# Patient Record
Sex: FEMALE | Race: WHITE | NOT HISPANIC OR LATINO | Employment: FULL TIME | URBAN - METROPOLITAN AREA
[De-identification: names, ages, dates, MRNs, and addresses within clinical notes are randomized per-mention and may not be internally consistent; named-entity substitution may affect disease eponyms.]

---

## 2019-07-11 ENCOUNTER — OFFICE VISIT (OUTPATIENT)
Dept: URGENT CARE | Facility: MEDICAL CENTER | Age: 39
End: 2019-07-11
Payer: COMMERCIAL

## 2019-07-11 VITALS
DIASTOLIC BLOOD PRESSURE: 72 MMHG | OXYGEN SATURATION: 97 % | BODY MASS INDEX: 30.49 KG/M2 | SYSTOLIC BLOOD PRESSURE: 110 MMHG | TEMPERATURE: 98.9 F | WEIGHT: 183 LBS | RESPIRATION RATE: 18 BRPM | HEIGHT: 65 IN | HEART RATE: 98 BPM

## 2019-07-11 DIAGNOSIS — L03.031 CELLULITIS OF TOE OF RIGHT FOOT: Primary | ICD-10-CM

## 2019-07-11 PROCEDURE — G0382 LEV 3 HOSP TYPE B ED VISIT: HCPCS | Performed by: PHYSICIAN ASSISTANT

## 2019-07-11 RX ORDER — SULFAMETHOXAZOLE AND TRIMETHOPRIM 800; 160 MG/1; MG/1
1 TABLET ORAL EVERY 12 HOURS SCHEDULED
Qty: 14 TABLET | Refills: 0 | Status: SHIPPED | OUTPATIENT
Start: 2019-07-11 | End: 2019-07-18

## 2019-07-11 NOTE — PROGRESS NOTES
[facility]        NAME: Kala Guadarrama is a 35 y o  female  : 1980    MRN: [id number]  DATE: 2019  TIME: 6:32 PM    Assessment and Plan   Cellulitis of toe of right foot [L03 [id number]]  1  Cellulitis of toe of right foot  sulfamethoxazole-trimethoprim (BACTRIM DS) 800-160 mg per tablet         Patient Instructions     1  Take Bactrim DS 1 tablet twice daily x 7 days  2  Motrin as needed for pain  3  Recommend Podiatry consult- information provided  Chief Complaint     Chief Complaint   Patient presents with    Foot Swelling     Right foot swelling x 1 month  Pt was treated for cellulitis on 19  Pt had improvement but now swelling has returned  History of Present Illness       Melissa Crane is a 69-year-old female presents with pain and swelling of her right great toe that increased over the past week  Patient was treated for a foot cellulitis approximately 3 weeks ago and recently finished a 10 day course of Keflex  Patient had complete resolution of symptoms initially but started with redness, swelling increasing pain of the toe and foot over the past 5 days  Patient denies any fever or skin drainage  Review of Systems   Review of Systems   Constitutional: Negative  Musculoskeletal: Negative for gait problem and joint swelling  Skin: Positive for color change           Current Medications       Current Outpatient Medications:     sulfamethoxazole-trimethoprim (BACTRIM DS) 800-160 mg per tablet, Take 1 tablet by mouth every 12 (twelve) hours for 7 days, Disp: 14 tablet, Rfl: 0    Current Allergies     Allergies as of 2019    (No Known Allergies)            The following portions of the patient's history were reviewed and updated as appropriate: allergies, current medications, past family history, past medical history, past social history, past surgical history and problem list      Past Medical History:   Diagnosis Date    Cellulitis        Past Surgical History:   Procedure Laterality Date    CHOLECYSTECTOMY         Family History   Problem Relation Age of Onset    No Known Problems Mother     Heart disease Father          Medications have been verified  Objective   /72   Pulse 98   Temp 98 9 °F (37 2 °C) (Temporal)   Resp 18   Ht 5' 5" (1 651 m)   Wt 83 kg (183 lb)   LMP 06/27/2019 (Exact Date)   SpO2 97%   BMI 30 45 kg/m²        Physical Exam     Physical Exam   Constitutional: She appears well-developed and well-nourished  No distress  Cardiovascular: Normal rate, regular rhythm and normal heart sounds  No murmur heard    Pulmonary/Chest: Effort normal and breath sounds normal    Musculoskeletal:        Feet:    Skin:

## 2020-09-16 ENCOUNTER — OFFICE VISIT (OUTPATIENT)
Dept: FAMILY MEDICINE CLINIC | Facility: CLINIC | Age: 40
End: 2020-09-16
Payer: COMMERCIAL

## 2020-09-16 VITALS
RESPIRATION RATE: 18 BRPM | TEMPERATURE: 98.5 F | HEART RATE: 75 BPM | SYSTOLIC BLOOD PRESSURE: 124 MMHG | BODY MASS INDEX: 30.99 KG/M2 | HEIGHT: 65 IN | WEIGHT: 186 LBS | DIASTOLIC BLOOD PRESSURE: 72 MMHG | OXYGEN SATURATION: 98 %

## 2020-09-16 DIAGNOSIS — Z82.49 FAMILY HISTORY OF BLOOD CLOTS: Primary | ICD-10-CM

## 2020-09-16 DIAGNOSIS — Z12.31 SCREENING MAMMOGRAM FOR HIGH-RISK PATIENT: ICD-10-CM

## 2020-09-16 DIAGNOSIS — Z00.00 WELL ADULT HEALTH CHECK: ICD-10-CM

## 2020-09-16 PROCEDURE — 1036F TOBACCO NON-USER: CPT | Performed by: FAMILY MEDICINE

## 2020-09-16 PROCEDURE — 3725F SCREEN DEPRESSION PERFORMED: CPT | Performed by: FAMILY MEDICINE

## 2020-09-16 PROCEDURE — 99215 OFFICE O/P EST HI 40 MIN: CPT | Performed by: FAMILY MEDICINE

## 2020-09-16 NOTE — PROGRESS NOTES
BMI Counseling: Body mass index is 30 95 kg/m²  The BMI is above normal  Nutrition recommendations include decreasing portion sizes, encouraging healthy choices of fruits and vegetables, decreasing fast food intake, moderation in carbohydrate intake, increasing intake of lean protein, reducing intake of saturated and trans fat and reducing intake of cholesterol  Exercise recommendations include exercising 3-5 times per week  No pharmacotherapy was ordered  Patient referred to PCP due to patient being overweight  Assessment/Plan:      Diagnoses and all orders for this visit:    Family history of blood clots  -     Prothrombin w/INR + Partial Thromboplastin Times; Future  -     von Willebrand Profile; Future  -     Antithrombin III Activity W/Refl; Future  -     Protein C and S Activity With Reflex To Protein C and/or S Antigen; Future    Screening mammogram for high-risk patient  -     Mammo screening bilateral w 3d & cad; Future  -     TSH + Free T4; Future  -     Comprehensive metabolic panel; Future  -     CBC and differential; Future  -     Magnesium; Future  -     Uric acid; Future  -     Lipid Panel with Direct LDL reflex; Future  -     Urinalysis with microscopic    Well adult health check  -     Ambulatory referral to Obstetrics / Gynecology; Future          Subjective:     Patient ID: Mallorie Ling is a 38 y o  female  Pt for initial exam as a new patient  Pt with a family history of  Blood clots  Pt  Takes no meds and is doing well  Pt is a non smoker  Review of Systems   Constitutional: Negative for activity change, appetite change, fatigue and fever  HENT: Negative for congestion, ear pain, postnasal drip, rhinorrhea, sinus pressure, sinus pain, sneezing and sore throat  Eyes: Negative for pain and redness  Respiratory: Negative for apnea, cough, chest tightness, shortness of breath and wheezing  Cardiovascular: Negative for chest pain, palpitations and leg swelling  Gastrointestinal: Negative for abdominal pain, constipation, diarrhea, nausea and vomiting  Endocrine: Negative for cold intolerance and heat intolerance  Genitourinary: Negative for difficulty urinating, dysuria, frequency, hematuria and urgency  Musculoskeletal: Negative for arthralgias, back pain, gait problem and myalgias  Skin: Negative for rash  Neurological: Negative for dizziness, speech difficulty, weakness, numbness and headaches  Hematological: Does not bruise/bleed easily  Psychiatric/Behavioral: Negative for agitation, confusion and hallucinations  Objective:     Physical Exam  Vitals signs and nursing note reviewed  Constitutional:       Appearance: She is well-developed  She is obese  HENT:      Head: Normocephalic and atraumatic  Nose: Nose normal    Eyes:      General: No scleral icterus  Conjunctiva/sclera: Conjunctivae normal       Pupils: Pupils are equal, round, and reactive to light  Neck:      Musculoskeletal: Normal range of motion and neck supple  Thyroid: No thyromegaly  Cardiovascular:      Rate and Rhythm: Normal rate and regular rhythm  Pulmonary:      Effort: Pulmonary effort is normal       Breath sounds: Normal breath sounds  No wheezing  Abdominal:      General: Bowel sounds are normal  There is no distension  Palpations: Abdomen is soft  Tenderness: There is no abdominal tenderness  There is no guarding or rebound  Musculoskeletal: Normal range of motion  General: No tenderness or deformity  Skin:     General: Skin is warm and dry  Findings: No erythema or rash  Neurological:      Mental Status: She is alert and oriented to person, place, and time  Sensory: No sensory deficit  Psychiatric:         Behavior: Behavior normal          Thought Content:  Thought content normal          Judgment: Judgment normal

## 2020-09-19 ENCOUNTER — APPOINTMENT (OUTPATIENT)
Dept: LAB | Facility: MEDICAL CENTER | Age: 40
End: 2020-09-19
Payer: COMMERCIAL

## 2020-09-19 DIAGNOSIS — Z82.49 FAMILY HISTORY OF BLOOD CLOTS: ICD-10-CM

## 2020-09-19 DIAGNOSIS — Z12.31 SCREENING MAMMOGRAM FOR HIGH-RISK PATIENT: ICD-10-CM

## 2020-09-19 LAB
ALBUMIN SERPL BCP-MCNC: 4.2 G/DL (ref 3.5–5)
ALP SERPL-CCNC: 50 U/L (ref 46–116)
ALT SERPL W P-5'-P-CCNC: 18 U/L (ref 12–78)
ANION GAP SERPL CALCULATED.3IONS-SCNC: 5 MMOL/L (ref 4–13)
APTT PPP: 30 SECONDS (ref 23–37)
AST SERPL W P-5'-P-CCNC: 11 U/L (ref 5–45)
BACTERIA UR QL AUTO: NORMAL /HPF
BASOPHILS # BLD AUTO: 0.08 THOUSANDS/ΜL (ref 0–0.1)
BASOPHILS NFR BLD AUTO: 1 % (ref 0–1)
BILIRUB SERPL-MCNC: 0.68 MG/DL (ref 0.2–1)
BILIRUB UR QL STRIP: NEGATIVE
BUN SERPL-MCNC: 17 MG/DL (ref 5–25)
CALCIUM SERPL-MCNC: 9.7 MG/DL (ref 8.3–10.1)
CHLORIDE SERPL-SCNC: 106 MMOL/L (ref 100–108)
CHOLEST SERPL-MCNC: 208 MG/DL (ref 50–200)
CLARITY UR: CLEAR
CO2 SERPL-SCNC: 26 MMOL/L (ref 21–32)
COLOR UR: YELLOW
CREAT SERPL-MCNC: 0.96 MG/DL (ref 0.6–1.3)
DEPRECATED AT III PPP: 89 % OF NORMAL (ref 92–136)
EOSINOPHIL # BLD AUTO: 0.14 THOUSAND/ΜL (ref 0–0.61)
EOSINOPHIL NFR BLD AUTO: 1 % (ref 0–6)
ERYTHROCYTE [DISTWIDTH] IN BLOOD BY AUTOMATED COUNT: 13 % (ref 11.6–15.1)
GFR SERPL CREATININE-BSD FRML MDRD: 74 ML/MIN/1.73SQ M
GLUCOSE P FAST SERPL-MCNC: 73 MG/DL (ref 65–99)
GLUCOSE UR STRIP-MCNC: NEGATIVE MG/DL
HCT VFR BLD AUTO: 39.6 % (ref 34.8–46.1)
HDLC SERPL-MCNC: 62 MG/DL
HGB BLD-MCNC: 12.9 G/DL (ref 11.5–15.4)
HGB UR QL STRIP.AUTO: NEGATIVE
HYALINE CASTS #/AREA URNS LPF: NORMAL /LPF
IMM GRANULOCYTES # BLD AUTO: 0.01 THOUSAND/UL (ref 0–0.2)
IMM GRANULOCYTES NFR BLD AUTO: 0 % (ref 0–2)
INR PPP: 0.95 (ref 0.84–1.19)
KETONES UR STRIP-MCNC: NEGATIVE MG/DL
LDLC SERPL CALC-MCNC: 123 MG/DL (ref 0–100)
LEUKOCYTE ESTERASE UR QL STRIP: NEGATIVE
LYMPHOCYTES # BLD AUTO: 2.04 THOUSANDS/ΜL (ref 0.6–4.47)
LYMPHOCYTES NFR BLD AUTO: 21 % (ref 14–44)
MAGNESIUM SERPL-MCNC: 2.4 MG/DL (ref 1.6–2.6)
MCH RBC QN AUTO: 27.8 PG (ref 26.8–34.3)
MCHC RBC AUTO-ENTMCNC: 32.6 G/DL (ref 31.4–37.4)
MCV RBC AUTO: 85 FL (ref 82–98)
MONOCYTES # BLD AUTO: 0.47 THOUSAND/ΜL (ref 0.17–1.22)
MONOCYTES NFR BLD AUTO: 5 % (ref 4–12)
NEUTROPHILS # BLD AUTO: 6.97 THOUSANDS/ΜL (ref 1.85–7.62)
NEUTS SEG NFR BLD AUTO: 72 % (ref 43–75)
NITRITE UR QL STRIP: NEGATIVE
NON-SQ EPI CELLS URNS QL MICRO: NORMAL /HPF
NRBC BLD AUTO-RTO: 0 /100 WBCS
PH UR STRIP.AUTO: 6.5 [PH]
PLATELET # BLD AUTO: 336 THOUSANDS/UL (ref 149–390)
PMV BLD AUTO: 10.2 FL (ref 8.9–12.7)
POTASSIUM SERPL-SCNC: 4.5 MMOL/L (ref 3.5–5.3)
PROT SERPL-MCNC: 8.1 G/DL (ref 6.4–8.2)
PROT UR STRIP-MCNC: NEGATIVE MG/DL
PROTHROMBIN TIME: 12.7 SECONDS (ref 11.6–14.5)
RBC # BLD AUTO: 4.64 MILLION/UL (ref 3.81–5.12)
RBC #/AREA URNS AUTO: NORMAL /HPF
SODIUM SERPL-SCNC: 137 MMOL/L (ref 136–145)
SP GR UR STRIP.AUTO: 1.01 (ref 1–1.03)
T4 FREE SERPL-MCNC: 1 NG/DL (ref 0.76–1.46)
TRIGL SERPL-MCNC: 117 MG/DL
TSH SERPL DL<=0.05 MIU/L-ACNC: 3.07 UIU/ML (ref 0.36–3.74)
URATE SERPL-MCNC: 6.4 MG/DL (ref 2–6.8)
UROBILINOGEN UR QL STRIP.AUTO: 0.2 E.U./DL
WBC # BLD AUTO: 9.71 THOUSAND/UL (ref 4.31–10.16)
WBC #/AREA URNS AUTO: NORMAL /HPF

## 2020-09-19 PROCEDURE — 84443 ASSAY THYROID STIM HORMONE: CPT

## 2020-09-19 PROCEDURE — 85240 CLOT FACTOR VIII AHG 1 STAGE: CPT

## 2020-09-19 PROCEDURE — 85303 CLOT INHIBIT PROT C ACTIVITY: CPT

## 2020-09-19 PROCEDURE — 85246 CLOT FACTOR VIII VW ANTIGEN: CPT

## 2020-09-19 PROCEDURE — 80053 COMPREHEN METABOLIC PANEL: CPT

## 2020-09-19 PROCEDURE — 85245 CLOT FACTOR VIII VW RISTOCTN: CPT

## 2020-09-19 PROCEDURE — 85025 COMPLETE CBC W/AUTO DIFF WBC: CPT

## 2020-09-19 PROCEDURE — 36415 COLL VENOUS BLD VENIPUNCTURE: CPT

## 2020-09-19 PROCEDURE — 80061 LIPID PANEL: CPT

## 2020-09-19 PROCEDURE — 84550 ASSAY OF BLOOD/URIC ACID: CPT

## 2020-09-19 PROCEDURE — 85730 THROMBOPLASTIN TIME PARTIAL: CPT

## 2020-09-19 PROCEDURE — 81001 URINALYSIS AUTO W/SCOPE: CPT | Performed by: FAMILY MEDICINE

## 2020-09-19 PROCEDURE — 85300 ANTITHROMBIN III ACTIVITY: CPT

## 2020-09-19 PROCEDURE — 83735 ASSAY OF MAGNESIUM: CPT

## 2020-09-19 PROCEDURE — 84439 ASSAY OF FREE THYROXINE: CPT

## 2020-09-19 PROCEDURE — 85306 CLOT INHIBIT PROT S FREE: CPT

## 2020-09-19 PROCEDURE — 85610 PROTHROMBIN TIME: CPT

## 2020-09-22 LAB
FACT XIIIA PPP-ACNC: 97 % (ref 56–140)
VWF AG ACT/NOR PPP IA: 146 % (ref 50–200)
VWF:RCO ACT/NOR PPP PL AGG: 138 % (ref 50–200)

## 2020-09-24 ENCOUNTER — HOSPITAL ENCOUNTER (OUTPATIENT)
Dept: MAMMOGRAPHY | Facility: CLINIC | Age: 40
Discharge: HOME | End: 2020-09-24
Payer: COMMERCIAL

## 2020-09-24 VITALS — TEMPERATURE: 96.9 F | HEIGHT: 65 IN | BODY MASS INDEX: 30.82 KG/M2 | WEIGHT: 185 LBS

## 2020-09-24 DIAGNOSIS — Z12.31 SCREENING MAMMOGRAM FOR HIGH-RISK PATIENT: ICD-10-CM

## 2020-09-24 LAB — MISCELLANEOUS LAB TEST RESULT: NORMAL

## 2020-09-24 PROCEDURE — 77067 SCR MAMMO BI INCL CAD: CPT

## 2020-09-24 PROCEDURE — 77063 BREAST TOMOSYNTHESIS BI: CPT

## 2020-10-04 PROBLEM — Z82.49 FAMILY HISTORY OF BLOOD CLOTS: Status: ACTIVE | Noted: 2020-10-04

## 2020-10-04 PROBLEM — Z00.00 WELL ADULT HEALTH CHECK: Status: ACTIVE | Noted: 2020-10-04

## 2020-10-26 ENCOUNTER — OFFICE VISIT (OUTPATIENT)
Dept: URGENT CARE | Facility: MEDICAL CENTER | Age: 40
End: 2020-10-26
Payer: COMMERCIAL

## 2020-10-26 VITALS
SYSTOLIC BLOOD PRESSURE: 116 MMHG | WEIGHT: 184.5 LBS | HEART RATE: 97 BPM | BODY MASS INDEX: 30.74 KG/M2 | TEMPERATURE: 98 F | DIASTOLIC BLOOD PRESSURE: 74 MMHG | OXYGEN SATURATION: 100 % | HEIGHT: 65 IN

## 2020-10-26 DIAGNOSIS — L25.5 RHUS DERMATITIS: Primary | ICD-10-CM

## 2020-10-26 PROCEDURE — G0382 LEV 3 HOSP TYPE B ED VISIT: HCPCS | Performed by: PHYSICIAN ASSISTANT

## 2020-10-26 PROCEDURE — S9083 URGENT CARE CENTER GLOBAL: HCPCS | Performed by: PHYSICIAN ASSISTANT

## 2020-10-26 RX ORDER — PREDNISONE 20 MG/1
40 TABLET ORAL DAILY
Qty: 10 TABLET | Refills: 0 | Status: SHIPPED | OUTPATIENT
Start: 2020-10-26 | End: 2020-10-31

## 2020-11-12 ENCOUNTER — OFFICE VISIT (OUTPATIENT)
Dept: FAMILY MEDICINE CLINIC | Facility: CLINIC | Age: 40
End: 2020-11-12
Payer: COMMERCIAL

## 2020-11-12 VITALS
HEIGHT: 65 IN | BODY MASS INDEX: 30.99 KG/M2 | DIASTOLIC BLOOD PRESSURE: 80 MMHG | HEART RATE: 100 BPM | RESPIRATION RATE: 16 BRPM | OXYGEN SATURATION: 98 % | SYSTOLIC BLOOD PRESSURE: 120 MMHG | WEIGHT: 186 LBS | TEMPERATURE: 97.2 F

## 2020-11-12 DIAGNOSIS — E78.2 MIXED HYPERLIPIDEMIA: Primary | ICD-10-CM

## 2020-11-12 DIAGNOSIS — Z23 IMMUNIZATION DUE: ICD-10-CM

## 2020-11-12 PROCEDURE — 99213 OFFICE O/P EST LOW 20 MIN: CPT | Performed by: FAMILY MEDICINE

## 2020-11-12 PROCEDURE — 3008F BODY MASS INDEX DOCD: CPT | Performed by: FAMILY MEDICINE

## 2020-11-12 PROCEDURE — 1036F TOBACCO NON-USER: CPT | Performed by: FAMILY MEDICINE

## 2020-11-12 PROCEDURE — 90682 RIV4 VACC RECOMBINANT DNA IM: CPT | Performed by: FAMILY MEDICINE

## 2020-11-12 PROCEDURE — 90471 IMMUNIZATION ADMIN: CPT | Performed by: FAMILY MEDICINE

## 2020-12-20 ENCOUNTER — NURSE TRIAGE (OUTPATIENT)
Dept: OTHER | Facility: OTHER | Age: 40
End: 2020-12-20

## 2020-12-20 DIAGNOSIS — Z11.59 ENCOUNTER FOR SCREENING FOR OTHER VIRAL DISEASES: Primary | ICD-10-CM

## 2020-12-21 DIAGNOSIS — Z11.59 ENCOUNTER FOR SCREENING FOR OTHER VIRAL DISEASES: ICD-10-CM

## 2020-12-21 PROCEDURE — U0003 INFECTIOUS AGENT DETECTION BY NUCLEIC ACID (DNA OR RNA); SEVERE ACUTE RESPIRATORY SYNDROME CORONAVIRUS 2 (SARS-COV-2) (CORONAVIRUS DISEASE [COVID-19]), AMPLIFIED PROBE TECHNIQUE, MAKING USE OF HIGH THROUGHPUT TECHNOLOGIES AS DESCRIBED BY CMS-2020-01-R: HCPCS | Performed by: FAMILY MEDICINE

## 2020-12-22 LAB — SARS-COV-2 RNA SPEC QL NAA+PROBE: NOT DETECTED

## 2021-02-12 PROBLEM — E78.2 MIXED HYPERLIPIDEMIA: Status: ACTIVE | Noted: 2021-02-12

## 2021-02-13 ENCOUNTER — LAB (OUTPATIENT)
Dept: LAB | Facility: MEDICAL CENTER | Age: 41
End: 2021-02-13
Payer: COMMERCIAL

## 2021-02-13 DIAGNOSIS — E78.2 MIXED HYPERLIPIDEMIA: ICD-10-CM

## 2021-02-13 LAB
CHOLEST SERPL-MCNC: 219 MG/DL (ref 50–200)
HDLC SERPL-MCNC: 67 MG/DL
LDLC SERPL CALC-MCNC: 125 MG/DL (ref 0–100)
TRIGL SERPL-MCNC: 136 MG/DL

## 2021-02-13 PROCEDURE — 36415 COLL VENOUS BLD VENIPUNCTURE: CPT

## 2021-02-13 PROCEDURE — 80061 LIPID PANEL: CPT

## 2021-09-22 ENCOUNTER — OFFICE VISIT (OUTPATIENT)
Dept: FAMILY MEDICINE CLINIC | Facility: CLINIC | Age: 41
End: 2021-09-22
Payer: COMMERCIAL

## 2021-09-22 VITALS
HEART RATE: 100 BPM | RESPIRATION RATE: 16 BRPM | SYSTOLIC BLOOD PRESSURE: 122 MMHG | WEIGHT: 191 LBS | DIASTOLIC BLOOD PRESSURE: 80 MMHG | BODY MASS INDEX: 31.82 KG/M2 | HEIGHT: 65 IN | OXYGEN SATURATION: 98 % | TEMPERATURE: 97.7 F

## 2021-09-22 DIAGNOSIS — Z12.31 ENCOUNTER FOR SCREENING MAMMOGRAM FOR MALIGNANT NEOPLASM OF BREAST: ICD-10-CM

## 2021-09-22 DIAGNOSIS — Z00.00 ENCOUNTER FOR PREVENTATIVE ADULT HEALTH CARE EXAMINATION: Primary | ICD-10-CM

## 2021-09-22 DIAGNOSIS — R01.0 MURMUR, FUNCTIONAL: ICD-10-CM

## 2021-09-22 DIAGNOSIS — Z13.220 SCREENING FOR HYPERLIPIDEMIA: ICD-10-CM

## 2021-09-22 DIAGNOSIS — Z11.59 ENCOUNTER FOR HEPATITIS C SCREENING TEST FOR LOW RISK PATIENT: ICD-10-CM

## 2021-09-22 PROCEDURE — 99396 PREV VISIT EST AGE 40-64: CPT | Performed by: NURSE PRACTITIONER

## 2021-10-19 ENCOUNTER — HOSPITAL ENCOUNTER (OUTPATIENT)
Dept: NON INVASIVE DIAGNOSTICS | Facility: CLINIC | Age: 41
Discharge: HOME | End: 2021-10-19
Payer: COMMERCIAL

## 2021-10-19 VITALS
HEART RATE: 72 BPM | DIASTOLIC BLOOD PRESSURE: 70 MMHG | BODY MASS INDEX: 31.82 KG/M2 | SYSTOLIC BLOOD PRESSURE: 118 MMHG | WEIGHT: 191 LBS | HEIGHT: 65 IN

## 2021-10-19 DIAGNOSIS — R01.0 MURMUR, FUNCTIONAL: ICD-10-CM

## 2021-10-19 LAB
AORTIC ROOT: 3 CM
AORTIC VALVE MEAN VELOCITY: 10.5 M/S
APICAL FOUR CHAMBER EJECTION FRACTION: 45 %
AV LVOT MEAN GRADIENT: 4 MMHG
AV LVOT PEAK GRADIENT: 8 MMHG
AV MEAN GRADIENT: 5 MMHG
AV PEAK GRADIENT: 9 MMHG
DOP CALC AO VTI: 32.63 CM
DOP CALC LVOT PEAK VEL VTI: 31.05 CM
DOP CALC LVOT PEAK VEL: 1.4 M/S
DOP CALC RVOT PEAK VEL: 0.94 M/S
DOP CALC RVOT VTI: 18.28 CM
E WAVE DECELERATION TIME: 236 MS
FRACTIONAL SHORTENING: 49 % (ref 28–44)
INTERVENTRICULAR SEPTUM IN DIASTOLE (PARASTERNAL SHORT AXIS VIEW): 0.8 CM
LEFT INTERNAL DIMENSION IN SYSTOLE: 2.3 CM (ref 2.1–4)
LEFT VENTRICULAR INTERNAL DIMENSION IN DIASTOLE: 4.5 CM (ref 4.88–7.26)
LEFT VENTRICULAR POSTERIOR WALL IN END DIASTOLE: 0.7 CM
LEFT VENTRICULAR STROKE VOLUME: 75 ML
LV EF: 83 %
MV E'TISSUE VEL-SEP: 11 CM/S
MV PEAK A VEL: 0.96 M/S
MV PEAK E VEL: 103 CM/S
MV STENOSIS PRESSURE HALF TIME: 0 MS
PV MEAN GRADIENT: 2 MMHG
PV MEAN VELOCITY: 67 CM/S
PV PEAK GRADIENT: 4 MMHG
PV VTI: 18.33 CM
RIGHT VENTRICLE ID DIMENSION: 1.9 CM
SL CV LV EF: 60
SL CV PED ECHO LEFT VENTRICLE DIASTOLIC VOLUME (MOD BIPLANE) 2D: 93 ML
SL CV PED ECHO LEFT VENTRICLE SYSTOLIC VOLUME (MOD BIPLANE) 2D: 18 ML
SL CV RVOT MAX GRADIENT: 4 MMHG
SL CV RVOT MEAN GRADIENT: 2 MMHG
SL CV RVOT VMEAN: 0.61 M/S
TR PEAK VELOCITY: 2 M/S
TRICUSPID VALVE PEAK REGURGITATION VELOCITY: 2.01 M/S
TRICUSPID VALVE S': 1.4 CM/S
TV PEAK GRADIENT: 16 MMHG
Z-SCORE OF LEFT VENTRICULAR DIMENSION IN END SYSTOLE: -2.8

## 2021-10-19 PROCEDURE — 93306 TTE W/DOPPLER COMPLETE: CPT | Performed by: INTERNAL MEDICINE

## 2021-10-19 PROCEDURE — 93306 TTE W/DOPPLER COMPLETE: CPT

## 2021-10-30 ENCOUNTER — APPOINTMENT (OUTPATIENT)
Dept: LAB | Facility: MEDICAL CENTER | Age: 41
End: 2021-10-30
Payer: COMMERCIAL

## 2021-10-30 DIAGNOSIS — Z11.59 ENCOUNTER FOR HEPATITIS C SCREENING TEST FOR LOW RISK PATIENT: ICD-10-CM

## 2021-10-30 DIAGNOSIS — Z00.00 ENCOUNTER FOR PREVENTATIVE ADULT HEALTH CARE EXAMINATION: ICD-10-CM

## 2021-10-30 DIAGNOSIS — Z13.220 SCREENING FOR HYPERLIPIDEMIA: ICD-10-CM

## 2021-10-30 LAB
ALBUMIN SERPL BCP-MCNC: 3.4 G/DL (ref 3.5–5)
ALP SERPL-CCNC: 62 U/L (ref 46–116)
ALT SERPL W P-5'-P-CCNC: 19 U/L (ref 12–78)
ANION GAP SERPL CALCULATED.3IONS-SCNC: 3 MMOL/L (ref 4–13)
AST SERPL W P-5'-P-CCNC: 12 U/L (ref 5–45)
BASOPHILS # BLD AUTO: 0.07 THOUSANDS/ΜL (ref 0–0.1)
BASOPHILS NFR BLD AUTO: 1 % (ref 0–1)
BILIRUB SERPL-MCNC: 0.76 MG/DL (ref 0.2–1)
BILIRUB UR QL STRIP: NEGATIVE
BUN SERPL-MCNC: 10 MG/DL (ref 5–25)
CALCIUM ALBUM COR SERPL-MCNC: 9.6 MG/DL (ref 8.3–10.1)
CALCIUM SERPL-MCNC: 9.1 MG/DL (ref 8.3–10.1)
CHLORIDE SERPL-SCNC: 107 MMOL/L (ref 100–108)
CHOLEST SERPL-MCNC: 186 MG/DL (ref 50–200)
CLARITY UR: CLEAR
CO2 SERPL-SCNC: 27 MMOL/L (ref 21–32)
COLOR UR: YELLOW
CREAT SERPL-MCNC: 0.81 MG/DL (ref 0.6–1.3)
EOSINOPHIL # BLD AUTO: 0.1 THOUSAND/ΜL (ref 0–0.61)
EOSINOPHIL NFR BLD AUTO: 1 % (ref 0–6)
ERYTHROCYTE [DISTWIDTH] IN BLOOD BY AUTOMATED COUNT: 13.3 % (ref 11.6–15.1)
GFR SERPL CREATININE-BSD FRML MDRD: 90 ML/MIN/1.73SQ M
GLUCOSE P FAST SERPL-MCNC: 85 MG/DL (ref 65–99)
GLUCOSE UR STRIP-MCNC: NEGATIVE MG/DL
HCT VFR BLD AUTO: 37.2 % (ref 34.8–46.1)
HDLC SERPL-MCNC: 56 MG/DL
HGB BLD-MCNC: 11.9 G/DL (ref 11.5–15.4)
HGB UR QL STRIP.AUTO: NEGATIVE
IMM GRANULOCYTES # BLD AUTO: 0.03 THOUSAND/UL (ref 0–0.2)
IMM GRANULOCYTES NFR BLD AUTO: 0 % (ref 0–2)
KETONES UR STRIP-MCNC: NEGATIVE MG/DL
LDLC SERPL CALC-MCNC: 109 MG/DL (ref 0–100)
LEUKOCYTE ESTERASE UR QL STRIP: NEGATIVE
LYMPHOCYTES # BLD AUTO: 2.51 THOUSANDS/ΜL (ref 0.6–4.47)
LYMPHOCYTES NFR BLD AUTO: 22 % (ref 14–44)
MCH RBC QN AUTO: 28.3 PG (ref 26.8–34.3)
MCHC RBC AUTO-ENTMCNC: 32 G/DL (ref 31.4–37.4)
MCV RBC AUTO: 88 FL (ref 82–98)
MONOCYTES # BLD AUTO: 0.59 THOUSAND/ΜL (ref 0.17–1.22)
MONOCYTES NFR BLD AUTO: 5 % (ref 4–12)
NEUTROPHILS # BLD AUTO: 8.22 THOUSANDS/ΜL (ref 1.85–7.62)
NEUTS SEG NFR BLD AUTO: 71 % (ref 43–75)
NITRITE UR QL STRIP: NEGATIVE
NONHDLC SERPL-MCNC: 130 MG/DL
NRBC BLD AUTO-RTO: 0 /100 WBCS
PH UR STRIP.AUTO: 6.5 [PH]
PLATELET # BLD AUTO: 345 THOUSANDS/UL (ref 149–390)
PMV BLD AUTO: 10 FL (ref 8.9–12.7)
POTASSIUM SERPL-SCNC: 4.7 MMOL/L (ref 3.5–5.3)
PROT SERPL-MCNC: 7.2 G/DL (ref 6.4–8.2)
PROT UR STRIP-MCNC: NEGATIVE MG/DL
RBC # BLD AUTO: 4.21 MILLION/UL (ref 3.81–5.12)
SODIUM SERPL-SCNC: 137 MMOL/L (ref 136–145)
SP GR UR STRIP.AUTO: 1.01 (ref 1–1.03)
TRIGL SERPL-MCNC: 107 MG/DL
TSH SERPL DL<=0.05 MIU/L-ACNC: 3.38 UIU/ML (ref 0.36–3.74)
UROBILINOGEN UR QL STRIP.AUTO: 1 E.U./DL
WBC # BLD AUTO: 11.52 THOUSAND/UL (ref 4.31–10.16)

## 2021-10-30 PROCEDURE — 84443 ASSAY THYROID STIM HORMONE: CPT

## 2021-10-30 PROCEDURE — 80053 COMPREHEN METABOLIC PANEL: CPT

## 2021-10-30 PROCEDURE — 85025 COMPLETE CBC W/AUTO DIFF WBC: CPT

## 2021-10-30 PROCEDURE — 81003 URINALYSIS AUTO W/O SCOPE: CPT | Performed by: NURSE PRACTITIONER

## 2021-10-30 PROCEDURE — 36415 COLL VENOUS BLD VENIPUNCTURE: CPT

## 2021-10-30 PROCEDURE — 80061 LIPID PANEL: CPT

## 2021-12-30 ENCOUNTER — OFFICE VISIT (OUTPATIENT)
Dept: OBGYN CLINIC | Facility: MEDICAL CENTER | Age: 41
End: 2021-12-30
Payer: COMMERCIAL

## 2021-12-30 VITALS
WEIGHT: 195.2 LBS | BODY MASS INDEX: 32.52 KG/M2 | DIASTOLIC BLOOD PRESSURE: 68 MMHG | SYSTOLIC BLOOD PRESSURE: 120 MMHG | HEIGHT: 65 IN

## 2021-12-30 DIAGNOSIS — Z01.419 ENCOUNTER FOR GYNECOLOGICAL EXAMINATION WITHOUT ABNORMAL FINDING: Primary | ICD-10-CM

## 2021-12-30 DIAGNOSIS — N92.0 MENORRHAGIA WITH REGULAR CYCLE: ICD-10-CM

## 2021-12-30 PROBLEM — Z00.00 WELL ADULT HEALTH CHECK: Status: RESOLVED | Noted: 2020-10-04 | Resolved: 2021-12-30

## 2021-12-30 PROCEDURE — G0476 HPV COMBO ASSAY CA SCREEN: HCPCS | Performed by: OBSTETRICS & GYNECOLOGY

## 2021-12-30 PROCEDURE — 99386 PREV VISIT NEW AGE 40-64: CPT | Performed by: OBSTETRICS & GYNECOLOGY

## 2021-12-30 PROCEDURE — G0145 SCR C/V CYTO,THINLAYER,RESCR: HCPCS | Performed by: OBSTETRICS & GYNECOLOGY

## 2022-01-05 LAB
HPV HR 12 DNA CVX QL NAA+PROBE: NEGATIVE
HPV16 DNA CVX QL NAA+PROBE: NEGATIVE
HPV18 DNA CVX QL NAA+PROBE: NEGATIVE

## 2022-01-08 LAB
LAB AP GYN PRIMARY INTERPRETATION: NORMAL
Lab: NORMAL

## 2022-01-11 ENCOUNTER — HOSPITAL ENCOUNTER (OUTPATIENT)
Dept: MAMMOGRAPHY | Facility: CLINIC | Age: 42
Discharge: HOME | End: 2022-01-11
Payer: COMMERCIAL

## 2022-01-11 VITALS — BODY MASS INDEX: 32.49 KG/M2 | HEIGHT: 65 IN | WEIGHT: 195 LBS

## 2022-01-11 DIAGNOSIS — Z12.31 ENCOUNTER FOR SCREENING MAMMOGRAM FOR MALIGNANT NEOPLASM OF BREAST: ICD-10-CM

## 2022-01-11 PROCEDURE — 77067 SCR MAMMO BI INCL CAD: CPT

## 2022-01-11 PROCEDURE — 77063 BREAST TOMOSYNTHESIS BI: CPT

## 2022-10-10 ENCOUNTER — OFFICE VISIT (OUTPATIENT)
Dept: FAMILY MEDICINE CLINIC | Facility: CLINIC | Age: 42
End: 2022-10-10
Payer: COMMERCIAL

## 2022-10-10 VITALS
TEMPERATURE: 98.8 F | OXYGEN SATURATION: 97 % | WEIGHT: 171 LBS | RESPIRATION RATE: 16 BRPM | BODY MASS INDEX: 28.49 KG/M2 | SYSTOLIC BLOOD PRESSURE: 120 MMHG | HEART RATE: 88 BPM | DIASTOLIC BLOOD PRESSURE: 66 MMHG | HEIGHT: 65 IN

## 2022-10-10 DIAGNOSIS — Z23 IMMUNIZATION DUE: ICD-10-CM

## 2022-10-10 DIAGNOSIS — Z13.220 SCREENING FOR HYPERLIPIDEMIA: ICD-10-CM

## 2022-10-10 DIAGNOSIS — Z12.31 ENCOUNTER FOR SCREENING MAMMOGRAM FOR MALIGNANT NEOPLASM OF BREAST: ICD-10-CM

## 2022-10-10 DIAGNOSIS — Z00.00 ENCOUNTER FOR PREVENTATIVE ADULT HEALTH CARE EXAMINATION: Primary | ICD-10-CM

## 2022-10-10 DIAGNOSIS — J06.9 URI WITH COUGH AND CONGESTION: ICD-10-CM

## 2022-10-10 PROCEDURE — 99396 PREV VISIT EST AGE 40-64: CPT | Performed by: NURSE PRACTITIONER

## 2022-10-10 PROCEDURE — 90471 IMMUNIZATION ADMIN: CPT | Performed by: NURSE PRACTITIONER

## 2022-10-10 PROCEDURE — 90686 IIV4 VACC NO PRSV 0.5 ML IM: CPT | Performed by: NURSE PRACTITIONER

## 2022-10-10 RX ORDER — AZITHROMYCIN 250 MG/1
TABLET, FILM COATED ORAL
Qty: 6 TABLET | Refills: 0 | Status: SHIPPED | OUTPATIENT
Start: 2022-10-10 | End: 2022-10-15

## 2022-10-10 NOTE — PROGRESS NOTES
Name: Renan Vargas      : 1980      MRN: [id number]  Encounter Provider: RYDER Coleman  Encounter Date: 10/10/2022   Encounter department: [address] [place] [address] Place     1  Encounter for preventative adult health care examination  -     CBC and differential; Future  -     Comprehensive metabolic panel; Future  -     Lipid panel; Future  -     TSH, 3rd generation; Future  -     UA w Reflex to Microscopic w Reflex to Culture    2  Immunization due  -     influenza vaccine, quadrivalent, 0 5 mL, preservative-free, for adult and pediatric patients 6 mos+ (AFLURIA, FLUARIX, FLULAVAL, FLUZONE)    3  Screening for hyperlipidemia    4  URI with cough and congestion  -     azithromycin (Zithromax) 250 mg tablet; Take 2 tablets (500 mg total) by mouth daily for 1 day, THEN 1 tablet (250 mg total) daily for 4 days  5  Encounter for screening mammogram for malignant neoplasm of breast  -     Mammo screening bilateral w 3d & cad; Future; Expected date: 10/10/2022      BMI Counseling: Body mass index is 28 46 kg/m²  The BMI is above normal  Nutrition recommendations include decreasing portion sizes, encouraging healthy choices of fruits and vegetables, decreasing fast food intake, consuming healthier snacks, limiting drinks that contain sugar, moderation in carbohydrate intake, increasing intake of lean protein, reducing intake of saturated and trans fat and reducing intake of cholesterol  Exercise recommendations include moderate physical activity 150 minutes/week, vigorous physical activity 75 minutes/week, exercising 3-5 times per week, obtaining a gym membership and strength training exercises  No pharmacotherapy was ordered  Patient referred to PCP  Rationale for BMI follow-up plan is due to patient being overweight or obese  Depression Screening and Follow-up Plan: Patient was screened for depression during today's encounter   They screened negative with a PHQ-2 score of 0  Subjective       Patient is here for routine follow-up  To review most recent labs  To also discuss current state of health and any new problems that they may be experiencing  Patient states that medications taken as prescribed and very well tolerated no new complaints at this time  Review of Systems   Constitutional: Negative for appetite change and fever  HENT: Negative for sinus pressure and sore throat  Eyes: Negative for pain  Respiratory: Negative for shortness of breath  Cardiovascular: Negative for chest pain  Gastrointestinal: Negative for abdominal pain  Genitourinary: Negative for dysuria  Musculoskeletal: Negative for arthralgias and myalgias  Skin: Negative for color change  Neurological: Negative for light-headedness  Psychiatric/Behavioral: Negative for behavioral problems  Past Medical History:   Diagnosis Date   • Cellulitis      Past Surgical History:   Procedure Laterality Date   • CHOLECYSTECTOMY       Family History   Problem Relation Age of Onset   • No Known Problems Mother    • Heart disease Father    • No Known Problems Daughter    • Breast cancer Maternal Grandmother 61   • No Known Problems Maternal Aunt    • No Known Problems Maternal Aunt    • No Known Problems Maternal Aunt    • No Known Problems Maternal Aunt    • No Known Problems Paternal Aunt      Social History     Socioeconomic History   • Marital status: /Civil Union     Spouse name: None   • Number of children: None   • Years of education: None   • Highest education level: None   Occupational History   • None   Tobacco Use   • Smoking status: Never Smoker   • Smokeless tobacco: Never Used   Vaping Use   • Vaping Use: Never used   Substance and Sexual Activity   • Alcohol use:  Yes     Alcohol/week: 2 0 standard drinks     Types: 2 Glasses of wine per week   • Drug use: None   • Sexual activity: None   Other Topics Concern   • None   Social History Narrative   • None Social Determinants of Health     Financial Resource Strain: Not on file   Food Insecurity: Not on file   Transportation Needs: Not on file   Physical Activity: Not on file   Stress: Not on file   Social Connections: Not on file   Intimate Partner Violence: Not on file   Housing Stability: Not on file     No current outpatient medications on file prior to visit  No Known Allergies  Immunization History   Administered Date(s) Administered   • COVID-19 MODERNA VACC 0 5 ML IM 04/23/2021, 05/21/2021   • Influenza, injectable, quadrivalent, preservative free 0 5 mL 10/10/2022   • Influenza, recombinant, quadrivalent,injectable, preservative free 11/12/2020       Objective     /66 (BP Location: Left arm, Patient Position: Sitting, Cuff Size: Standard)   Pulse 88   Temp 98 8 °F (37 1 °C) (Temporal)   Resp 16   Ht 5' 5" (1 651 m)   Wt 77 6 kg (171 lb)   LMP 09/27/2022 (Approximate)   SpO2 97%   BMI 28 46 kg/m²     Physical Exam  Vitals and nursing note reviewed  Constitutional:       General: She is not in acute distress  Appearance: She is well-developed  She is not diaphoretic  HENT:      Head: Normocephalic and atraumatic  Right Ear: External ear normal       Left Ear: External ear normal       Mouth/Throat:      Pharynx: Oropharynx is clear  Eyes:      Pupils: Pupils are equal, round, and reactive to light  Cardiovascular:      Rate and Rhythm: Normal rate and regular rhythm  Heart sounds: Normal heart sounds  Pulmonary:      Effort: Pulmonary effort is normal       Breath sounds: Normal breath sounds  Abdominal:      Palpations: Abdomen is soft  Musculoskeletal:         General: Normal range of motion  Cervical back: Normal range of motion and neck supple  Skin:     General: Skin is dry  Neurological:      General: No focal deficit present  Mental Status: She is alert and oriented to person, place, and time     Psychiatric:         Behavior: Behavior normal          Thought Content:  Thought content normal        Jeannette Counter, CRNP

## 2022-10-29 ENCOUNTER — APPOINTMENT (OUTPATIENT)
Dept: LAB | Facility: MEDICAL CENTER | Age: 42
End: 2022-10-29

## 2022-10-29 DIAGNOSIS — Z00.00 ENCOUNTER FOR PREVENTATIVE ADULT HEALTH CARE EXAMINATION: ICD-10-CM

## 2022-10-29 LAB
ALBUMIN SERPL BCP-MCNC: 4.1 G/DL (ref 3.5–5)
ALP SERPL-CCNC: 45 U/L (ref 46–116)
ALT SERPL W P-5'-P-CCNC: 17 U/L (ref 12–78)
ANION GAP SERPL CALCULATED.3IONS-SCNC: 2 MMOL/L (ref 4–13)
AST SERPL W P-5'-P-CCNC: 8 U/L (ref 5–45)
BACTERIA UR QL AUTO: NORMAL /HPF
BASOPHILS # BLD AUTO: 0.08 THOUSANDS/ÂΜL (ref 0–0.1)
BASOPHILS NFR BLD AUTO: 1 % (ref 0–1)
BILIRUB SERPL-MCNC: 0.48 MG/DL (ref 0.2–1)
BILIRUB UR QL STRIP: NEGATIVE
BUN SERPL-MCNC: 22 MG/DL (ref 5–25)
CALCIUM SERPL-MCNC: 9.4 MG/DL (ref 8.3–10.1)
CHLORIDE SERPL-SCNC: 106 MMOL/L (ref 96–108)
CHOLEST SERPL-MCNC: 196 MG/DL
CLARITY UR: CLEAR
CO2 SERPL-SCNC: 30 MMOL/L (ref 21–32)
COLOR UR: ABNORMAL
CREAT SERPL-MCNC: 0.99 MG/DL (ref 0.6–1.3)
EOSINOPHIL # BLD AUTO: 0.14 THOUSAND/ÂΜL (ref 0–0.61)
EOSINOPHIL NFR BLD AUTO: 2 % (ref 0–6)
ERYTHROCYTE [DISTWIDTH] IN BLOOD BY AUTOMATED COUNT: 13 % (ref 11.6–15.1)
GFR SERPL CREATININE-BSD FRML MDRD: 70 ML/MIN/1.73SQ M
GLUCOSE P FAST SERPL-MCNC: 98 MG/DL (ref 65–99)
GLUCOSE UR STRIP-MCNC: NEGATIVE MG/DL
HCT VFR BLD AUTO: 41.3 % (ref 34.8–46.1)
HCV AB SER QL: NORMAL
HDLC SERPL-MCNC: 76 MG/DL
HGB BLD-MCNC: 13.1 G/DL (ref 11.5–15.4)
HGB UR QL STRIP.AUTO: NEGATIVE
IMM GRANULOCYTES # BLD AUTO: 0.01 THOUSAND/UL (ref 0–0.2)
IMM GRANULOCYTES NFR BLD AUTO: 0 % (ref 0–2)
KETONES UR STRIP-MCNC: NEGATIVE MG/DL
LDLC SERPL CALC-MCNC: 108 MG/DL (ref 0–100)
LEUKOCYTE ESTERASE UR QL STRIP: NEGATIVE
LYMPHOCYTES # BLD AUTO: 2.12 THOUSANDS/ÂΜL (ref 0.6–4.47)
LYMPHOCYTES NFR BLD AUTO: 25 % (ref 14–44)
MCH RBC QN AUTO: 28.5 PG (ref 26.8–34.3)
MCHC RBC AUTO-ENTMCNC: 31.7 G/DL (ref 31.4–37.4)
MCV RBC AUTO: 90 FL (ref 82–98)
MONOCYTES # BLD AUTO: 0.48 THOUSAND/ÂΜL (ref 0.17–1.22)
MONOCYTES NFR BLD AUTO: 6 % (ref 4–12)
NEUTROPHILS # BLD AUTO: 5.82 THOUSANDS/ÂΜL (ref 1.85–7.62)
NEUTS SEG NFR BLD AUTO: 66 % (ref 43–75)
NITRITE UR QL STRIP: NEGATIVE
NON-SQ EPI CELLS URNS QL MICRO: NORMAL /HPF
NONHDLC SERPL-MCNC: 120 MG/DL
NRBC BLD AUTO-RTO: 0 /100 WBCS
PH UR STRIP.AUTO: 7.5 [PH]
PLATELET # BLD AUTO: 346 THOUSANDS/UL (ref 149–390)
PMV BLD AUTO: 10.6 FL (ref 8.9–12.7)
POTASSIUM SERPL-SCNC: 4.3 MMOL/L (ref 3.5–5.3)
PROT SERPL-MCNC: 7.4 G/DL (ref 6.4–8.4)
PROT UR STRIP-MCNC: ABNORMAL MG/DL
RBC # BLD AUTO: 4.59 MILLION/UL (ref 3.81–5.12)
RBC #/AREA URNS AUTO: NORMAL /HPF
SODIUM SERPL-SCNC: 138 MMOL/L (ref 135–147)
SP GR UR STRIP.AUTO: 1.02 (ref 1–1.03)
TRIGL SERPL-MCNC: 58 MG/DL
TSH SERPL DL<=0.05 MIU/L-ACNC: 2.72 UIU/ML (ref 0.45–4.5)
UROBILINOGEN UR STRIP-ACNC: <2 MG/DL
WBC # BLD AUTO: 8.65 THOUSAND/UL (ref 4.31–10.16)
WBC #/AREA URNS AUTO: NORMAL /HPF

## 2022-10-29 PROCEDURE — 81001 URINALYSIS AUTO W/SCOPE: CPT | Performed by: NURSE PRACTITIONER

## 2023-01-03 NOTE — PROGRESS NOTES
[id number]  Vaginal   LMP: 12/20/2022  PMB:  SA:  Not currently   HPV: NO   Birth control:  Last pap: 12/30/2021 Negative HPV Negative   Last mammo: 01/11/2022: RTO in 1 years   Last colonoscopy: Not on file  Last Dexa: Not on file  Family History:    MGM- Breast Cancer ( alive )

## 2023-01-04 NOTE — PATIENT INSTRUCTIONS
Breast Self Exam for Women   AMBULATORY CARE:   A breast self-exam (BSE)  is a way to check your breasts for lumps and other changes  Regular BSEs can help you know how your breasts normally look and feel  Most breast lumps or changes are not cancer, but you should always have them checked by a healthcare provider  Why you should do a BSE:  Breast cancer is the most common type of cancer in women  Even if you have mammograms, you may still want to do a BSE regularly  If you know how your breasts normally feel and look, it may help you know when to contact your healthcare provider  Mammograms can miss some cancers  You may find a lump during a BSE that did not show up on a mammogram   When you should do a BSE:  If you have periods, you may want to do your BSE 1 week after your period ends  This is the time when your breasts may be the least swollen, lumpy, or tender  You can do regular BSEs even if you are breastfeeding or have breast implants  Call your doctor if:   You find any lumps or changes in your breasts  You have breast pain or fluid coming from your nipples  You have questions or concerns about your condition or care  How to do a BSE:       Look at your breasts in a mirror  Look at the size and shape of each breast and nipple  Check for swelling, lumps, dimpling, scaly skin, or other skin changes  Look for nipple changes, such as a nipple that is painful or beginning to pull inward  Gently squeeze both nipples and check to see if fluid (that is not breast milk) comes out of them  If you find any of these or other breast changes, contact your healthcare provider  Check your breasts while you sit or  the following 3 positions:    [place] your arms down at your sides  Raise your hands and join them behind your head  Put firm pressure with your hands on your hips  Bend slightly forward while you look at your breasts in the mirror  Lie down and feel your breasts    When you lie down, your breast tissue spreads out evenly over your chest  This makes it easier for you to feel for lumps and anything that may not be normal for your breasts  Do a BSE on one breast at a time  Place a small pillow or towel under your left shoulder  Put your left arm behind your head  Use the 3 middle fingers of your right hand  Use your fingertip pads, on the top of your fingers  Your fingertip pad is the most sensitive part of your finger  Use small circles to feel your breast tissue  Use your fingertip pads to make dime-sized, overlapping circles on your breast and armpits  Use light, medium, and firm pressure  First, press lightly  Second, press with medium pressure to feel a little deeper into the breast  Last, use firm pressure to feel deep within your breast     Examine your entire breast area  Examine the breast area from above the breast to below the breast where you feel only ribs  Make small circles with your fingertips, starting in the middle of your armpit  Make circles going up and down the breast area  Continue toward your breast and all the way across it  Examine the area from your armpit all the way over to the middle of your chest (breastbone)  Stop at the middle of your chest     Move the pillow or towel to your right shoulder, and put your right arm behind your head  Use the 3 fingertip pads of your left hand, and repeat the above steps to do a BSE on your right breast     What else you can do to check for breast problems or cancer:  Talk to your healthcare provider about mammograms  A mammogram is an x-ray of your breasts to screen for breast cancer or other problems  Your provider can tell you the benefits and risks of mammograms  The first mammogram is usually at age 39 or 48  Your provider may recommend you start at 36 or younger if your risk for breast cancer is high  Mammograms usually continue every 1 to 2 years until age 76         Follow up with your doctor as directed:  Write down your questions so you remember to ask them during your visits  © Copyright [employer] 2022 Information is for End User's use only and may not be sold, redistributed or otherwise used for commercial purposes  All illustrations and images included in CareNotes® are the copyrighted property of A D A M , Inc  or [employer]  The above information is an  only  It is not intended as medical advice for individual conditions or treatments  Talk to your doctor, nurse or pharmacist before following any medical regimen to see if it is safe and effective for you  Wellness Visit for Adults   AMBULATORY CARE:   A wellness visit  is when you see your healthcare provider to get screened for health problems  Your healthcare provider will also give you advice on how to stay healthy  Write down your questions so you remember to ask them  Ask your healthcare provider how often you should have a wellness visit  What happens at a wellness visit:  Your healthcare provider will ask about your health, and your family history of health problems  This includes high blood pressure, heart disease, and cancer  He or she will ask if you have symptoms that concern you, if you smoke, and about your mood  You may also be asked about your intake of medicines, supplements, food, and alcohol  Any of the following may be done: Your weight  will be checked  Your height may also be checked so your body mass index (BMI) can be calculated  Your BMI shows if you are at a healthy weight  Your blood pressure  and heart rate will be checked  Your temperature may also be checked  Blood and urine tests  may be done  Blood tests may be done to check your cholesterol levels  Abnormal cholesterol levels increase your risk for heart disease and stroke  You may also need a blood or urine test to check for diabetes if you are at increased risk  Urine tests may be done to look for signs of an infection or kidney disease      A physical exam  includes checking your heartbeat and lungs with a stethoscope  Your healthcare provider may also check your skin to look for sun damage  Screening tests  may be recommended  A screening test is done to check for diseases that may not cause symptoms  The screening tests you may need depend on your age, gender, family history, and lifestyle habits  For example, colorectal screening may be recommended if you are 48years old or older  Screening tests you need if you are a woman:   A Pap smear  is used to screen for cervical cancer  Pap smears are usually done every 3 to 5 years depending on your age  You may need them more often if you have had abnormal Pap smear test results in the past  Ask your healthcare provider how often you should have a Pap smear  A mammogram  is an x-ray of your breasts to screen for breast cancer  Experts recommend mammograms every 2 years starting at age 48 years  You may need a mammogram at age 52 years or younger if you have an increased risk for breast cancer  Talk to your healthcare provider about when you should start having mammograms and how often you need them  Vaccines you may need:   Get an influenza vaccine  every year  The influenza vaccine protects you from the flu  Several types of viruses cause the flu  The viruses change over time, so new vaccines are made each year  Get a tetanus-diphtheria (Td) booster vaccine  every 10 years  This vaccine protects you against tetanus and diphtheria  Tetanus is a severe infection that may cause painful muscle spasms and lockjaw  Diphtheria is a severe bacterial infection that causes a thick covering in the back of your mouth and throat  Get a human papillomavirus (HPV) vaccine  if you are female and aged 23 to 32 or male 23 to 24 and never received it  This vaccine protects you from HPV infection  HPV is the most common infection spread by sexual contact   HPV may also cause vaginal, penile, and anal cancers  Get a pneumococcal vaccine  if you are aged 72 years or older  The pneumococcal vaccine is an injection given to protect you from pneumococcal disease  Pneumococcal disease is an infection caused by pneumococcal bacteria  The infection may cause pneumonia, meningitis, or an ear infection  Get a shingles vaccine  if you are 60 or older, even if you have had shingles before  The shingles vaccine is an injection to protect you from the varicella-zoster virus  This is the same virus that causes chickenpox  Shingles is a painful rash that develops in people who had chickenpox or have been exposed to the virus  How to eat healthy:  My Plate is a model for planning healthy meals  It shows the types and amounts of foods that should go on your plate  Fruits and vegetables make up about half of your plate, and grains and protein make up the other half  A serving of dairy is included on the side of your plate  The amount of calories and serving sizes you need depends on your age, gender, weight, and height  Examples of healthy foods are listed below:  Eat a variety of vegetables  such as dark green, red, and orange vegetables  You can also include canned vegetables low in sodium (salt) and frozen vegetables without added butter or sauces  Eat a variety of fresh fruits , canned fruit in 100% juice, frozen fruit, and dried fruit  Include whole grains  At least half of the grains you eat should be whole grains  Examples include whole-wheat bread, wheat pasta, brown rice, and whole-grain cereals such as oatmeal     Eat a variety of protein foods such as seafood (fish and shellfish), lean meat, and poultry without skin (turkey and chicken)  Examples of lean meats include pork leg, shoulder, or tenderloin, and beef round, sirloin, tenderloin, and extra lean ground beef  Other protein foods include eggs and egg substitutes, beans, peas, soy products, nuts, and seeds      Choose low-fat dairy products such as skim or 1% milk or low-fat yogurt, cheese, and cottage cheese  Limit unhealthy fats  such as butter, hard margarine, and shortening  Exercise:  Exercise at least 30 minutes per day on most days of the week  Some examples of exercise include walking, biking, dancing, and swimming  You can also fit in more physical activity by taking the stairs instead of the elevator or parking farther away from stores  Include muscle strengthening activities 2 days each week  Regular exercise provides many health benefits  It helps you manage your weight, and decreases your risk for type 2 diabetes, heart disease, stroke, and high blood pressure  Exercise can also help improve your mood  Ask your healthcare provider about the best exercise plan for you  General health and safety guidelines:   Do not smoke  Nicotine and other chemicals in cigarettes and cigars can cause lung damage  Ask your healthcare provider for information if you currently smoke and need help to quit  E-cigarettes or smokeless tobacco still contain nicotine  Talk to your healthcare provider before you use these products  Limit alcohol  A drink of alcohol is 12 ounces of beer, 5 ounces of wine, or 1½ ounces of liquor  Lose weight, if needed  Being overweight increases your risk of certain health conditions  These include heart disease, high blood pressure, type 2 diabetes, and certain types of cancer  Protect your skin  Do not sunbathe or use tanning beds  Use sunscreen with a SPF 15 or higher  Apply sunscreen at least 15 minutes before you go outside  Reapply sunscreen every 2 hours  Wear protective clothing, hats, and sunglasses when you are outside  Drive safely  Always wear your seatbelt  Make sure everyone in your car wears a seatbelt  A seatbelt can save your life if you are in an accident  Do not use your cell phone when you are driving  This could distract you and cause an accident   Pull over if you need to make a call or send a text message  Practice safe sex  Use latex condoms if are sexually active and have more than one partner  Your healthcare provider may recommend screening tests for sexually transmitted infections (STIs)  Wear helmets, lifejackets, and protective gear  Always wear a helmet when you ride a bike or motorcycle, go skiing, or play sports that could cause a head injury  Wear protective equipment when you play sports  Wear a lifejacket when you are on a boat or doing water sports  © Copyright [employer] 2022 Information is for End User's use only and may not be sold, redistributed or otherwise used for commercial purposes  All illustrations and images included in CareNotes® are the copyrighted property of A D A M , Inc  or [facility] Ashish Khalil   The above information is an  only  It is not intended as medical advice for individual conditions or treatments  Talk to your doctor, nurse or pharmacist before following any medical regimen to see if it is safe and effective for you  Kegel Exercises for Women   AMBULATORY CARE:   Kegel exercises  help strengthen your pelvic muscles  Pelvic muscles hold your pelvic organs, such as your bladder and uterus, in place  Kegel exercises help prevent or control problems with urine incontinence (leakage)  Incontinence may be caused by pregnancy, childbirth, or menopause  Contact your healthcare provider if:   You cannot feel your muscles tighten or relax  You continue to leak urine  You have questions or concerns about your condition or care  Use the correct muscles:  Pelvic muscles are the muscles you use to control urine flow  To target these muscles, stop and start the flow of urine several times  This will help you become familiar with how it feels to tighten and relax these muscles  How to do Kegel exercises:   Empty your bladder  You may lie down, stand up, or sit down to do these exercises   When you first try to do these exercises, it may be easier if you lie down  Tighten or squeeze your pelvic muscles slowly  It may feel like you are trying to hold back urine or gas  Hold this position for 3 seconds  Relax for 3 seconds  Repeat this cycle 10 times  Do 10 sets of Kegel exercises, at least 3 times a day  Do not hold your breath when you do Kegel exercises  Keep your stomach, back, and leg muscles relaxed  As your muscles get stronger, you will be able to hold the squeeze longer  Your healthcare provider may ask that you increase your pelvic muscle squeeze to 10 seconds  After you squeeze for 10 seconds, relax for 10 seconds  What else you should know:   Once you know how to do Kegel exercises, use different positions  You can do these exercises while you lie on the floor, sit at your desk or watch TV, and while you stand  You may notice improved bladder control within about 6 weeks  Tighten your pelvic muscles before you sneeze, cough, or lift to prevent urine leakage  Follow up with your doctor as directed:  Write down your questions so you remember to ask them during your visits  © Copyright [employer] 2022 Information is for End User's use only and may not be sold, redistributed or otherwise used for commercial purposes  All illustrations and images included in CareNotes® are the copyrighted property of A D A M , Inc  or [facility]  The above information is an  only  It is not intended as medical advice for individual conditions or treatments  Talk to your doctor, nurse or pharmacist before following any medical regimen to see if it is safe and effective for you  Perineal Hygiene      Your vaginal naturally takes care of its self, it is a self washing system, the less you mess the healthier it will be     No soaps or feminine wash to the vulva, these products can cause dermitis, bacterial infections and other vulvar problems     Use only water to cleanse, or water with Rupert or [employer] Sensitive Skin Bar soap if necessary  No scented lotions or products are advised in or near your vulva  Use only coconut oil for moisture if needed  No douching this may cause imbalance in your vaginal PH and further issues  If you wear panty liners, you may apply a thin coating of Vaseline, A&D ointment or coconut oil to the vulvar tissues as a skin barrier     Cotton underware, loose fitting clothing  Only perfume-free, dye-free laundry detergent, use a second rinse cycle   Avoid fabric softeners/dryer sheets  Partner should avoid the same products as well  Over the counter probiotic to restore vaginal juan jose may be helpful as well, take daily  You may also look into Boric Acid vaginal suppositories to restore vaginal PH balance for up to 2 weeks as directed on the box  You may not use these if you are pregnant      For vaginal dryness: You may use:     Coconut oil (organic, pure, unscented) as needed for moisture or lubrication  ( Do not use if allergic)       Replens moisture restore external comfort gel daily ( use as directed on the box)        Replens long lasting vaginal moisturizer  ( use as directed on the box)         For Vaginal Lubrication:          You may use:     Coconut oil (organic, pure, unscented) as a lubricant or another scent-free lubricant (Astroglide, Uberlube) if needed  Do not use coconut oil or silicone if using a condom as this may break down the integrity of the condom and cause an unplanned pregnancy              Do not use coconut oil if allergic               Replens silky smooth lubricant, premium silicone based lubricant for intercourse  ( use as directed, a small amount will provide an enhanced natural feeling)     Any premium over the counter vaginal lubricant water or silicone based  Silicone based will have more staying power

## 2023-01-04 NOTE — PROGRESS NOTES
Diagnoses and all orders for this visit:    Encounter for gynecological examination without abnormal finding    Encounter for screening mammogram for malignant neoplasm of breast  -     Mammo screening bilateral w 3d & cad; Future      Perineal hygiene reviewed   Weight bearing exercises minium of 150 mins/weekly advised  Kegel exercises recommended  SBE encouraged, ASCCP guidelines reviewed  Condoms encouraged with all sexual activity to prevent STI's  Gardisil vaccines recommended up to age 39  Calcium/ Vit D dietary requirements discussed,   Advised to call with any issues,  all concerns & questions addressed  See provided information in your after visit summary     F/U Annually and PRN      Health Maintenance:    Last PAP: 12/30/2021 Neg/Neg  Next PAP Due: 2024    Last Mammogram: 01/11/2022    Life time Hilario Hernandez % 20 99, Density C heterogeneously dense , Bi-Rads 1 Negative  Next Mammogram: order given  Qualifies for additional imaging ? MRI    Last Colonoscopy: Not on file    advised age 38    Gardisil: Not completed Gardasil 9 was advised for prevention of HPV-related disease  We discussed risks/benefits, SE's/AE's at length and all questions were answered  Written info was provided for review  The patient agrees to consider and is aware she may call to schedule injection #1 at any time  Declines     Subjective    CC: Yearly Exam      Cricket Lind is a 36 y o  female here for an annual exam  Nikky Montes De Oca  GYN hx includes:  2 vaginal deliveries,    No personal Hx of breast, cervical, ovarian or colon CA  Family hx of: MGM with BC   Medically stable, reports no changes in medical Hx, follows with PMD    Patient's last menstrual period was 12/20/2022  Her menstrual cycles are regular every 28-30 days  She denies issues with bleeding or her menses  She take Naproxen with her cycles which helps reduce her flow and cramping   Denies history of abnormal pap smear    She denies breast concerns, abnormal vaginal discharge, vaginal itching, odor, irritation, bowel/bladder dysfunction, urinary symptoms, pelvic pain, or dyspareunia today  She is not sexually active at this time, is in a  Monogamous relationship  Her current method of contraception includes  none  Denies any issues with her BCM  Yessi Skipper She does not want STD testing today  Denies intimate partner violence    Children age 15 & 15   Works at [employer]     Past Medical History:   Diagnosis Date   • Cellulitis    • Varicella      Past Surgical History:   Procedure Laterality Date   • CHOLECYSTECTOMY         Immunization History   Administered Date(s) Administered   • COVID-19 MODERNA VACC 0 5 ML IM 2021, 2021   • INFLUENZA 10/10/2022   • Influenza, injectable, quadrivalent, preservative free 0 5 mL 10/10/2022   • Influenza, recombinant, quadrivalent,injectable, preservative free 2020       Family History   Problem Relation Age of Onset   • No Known Problems Mother    • Heart disease Father    • No Known Problems Daughter    • No Known Problems Son    • Breast cancer Maternal Grandmother 61   • No Known Problems Maternal Aunt    • No Known Problems Maternal Aunt    • No Known Problems Maternal Aunt    • No Known Problems Maternal Aunt    • No Known Problems Paternal Aunt      Social History     Tobacco Use   • Smoking status: Never   • Smokeless tobacco: Never   Vaping Use   • Vaping Use: Never used   Substance Use Topics   • Alcohol use: Yes     Alcohol/week: 2 0 standard drinks     Types: 2 Glasses of wine per week   • Drug use: Never     No current outpatient medications on file    Patient Active Problem List    Diagnosis Date Noted   • Mixed hyperlipidemia 2021   • Family history of blood clots 10/04/2020       No Known Allergies    OB History    Para Term  AB Living   2 2 2         SAB IAB Ectopic Multiple Live Births                  # Outcome Date GA Lbr Vinod/2nd Weight Sex Delivery Anes PTL Lv   2 Term            1 Term                Vitals:    01/05/23 0729   BP: 110/78   BP Location: Left arm   Patient Position: Sitting   Cuff Size: Large   Weight: 77 6 kg (171 lb)   Height: 5' 5" (1 651 m)     Body mass index is 28 46 kg/m²  Review of Systems     Constitutional: Negative for chills, fatigue, fever, headaches, visual disturbances, and unexpected weight change  Respiratory: Negative for cough, & shortness of breath  Cardiovascular: Negative for chest pain       Gastrointestinal: Negative for Abd pain, nausea & vomiting, constipation and diarrhea  Genitourinary: Negative for difficulty urinating, dysuria, hematuria, dyspareunia, unusual vaginal bleeding or discharge  Skin: Negative skin changes    Physical Exam     Constitutional: Alert & Oriented x3, well-developed and well-nourished  No distress  HENT: Atraumatic, Normocephalic, Conjunctivae clear  Neck: Normal range of motion  Neck supple  No thyromegaly, mass, nodules or tenderness  Pulmonary: Effort normal    Abdominal: Soft  No tenderness or masses  Musculoskeletal: Normal ROM  Skin: Warm & Dry  Psychological: Normal mood, thought content, behavior & judgement     Breasts:   Right: tissue soft without masses, tenderness, skin changes or nipple discharge  No areas of erythema or pain  No subclavicular, axillary, pectoral adenopathy  Left:  tissue soft without masses, tenderness, skin changes or nipple discharge  No areas of erythema or pain  No subclavicular, axillary, pectoral adenopathy    Pelvic exam was performed with patient supine, lithotomy position  Labia: Negative rash, tenderness, lesion or injury on the right labia  Negative rash, tenderness, lesion or injury on the left labia  Urethral meatus:  Negative for  tenderness, inflammation or discharge  Uterus: not deviated, enlarged, fixed or tender  Cervix: No CMT, no discharge or friability     Right adnexa: no mass, no tenderness and no fullness  Left adnexa: no mass, no tenderness and no fullness  Vagina: No erythema, tenderness, masses, or foreign body in the vagina  No signs of injury around the vagina  No unusual vaginal discharge   Perineum without lesions, signs of injury, erythema or swelling  Inguinal Canal:        Right: No inguinal adenopathy or hernia present  Left: No inguinal adenopathy or hernia present

## 2023-01-05 ENCOUNTER — ANNUAL EXAM (OUTPATIENT)
Dept: OBGYN CLINIC | Facility: MEDICAL CENTER | Age: 43
End: 2023-01-05

## 2023-01-05 VITALS
HEIGHT: 65 IN | WEIGHT: 171 LBS | BODY MASS INDEX: 28.49 KG/M2 | SYSTOLIC BLOOD PRESSURE: 110 MMHG | DIASTOLIC BLOOD PRESSURE: 78 MMHG

## 2023-01-05 DIAGNOSIS — Z12.31 ENCOUNTER FOR SCREENING MAMMOGRAM FOR MALIGNANT NEOPLASM OF BREAST: ICD-10-CM

## 2023-01-05 DIAGNOSIS — Z01.419 ENCOUNTER FOR GYNECOLOGICAL EXAMINATION WITHOUT ABNORMAL FINDING: Primary | ICD-10-CM

## 2023-03-13 ENCOUNTER — HOSPITAL ENCOUNTER (OUTPATIENT)
Dept: MAMMOGRAPHY | Facility: CLINIC | Age: 43
Discharge: HOME | End: 2023-03-13

## 2023-03-13 VITALS — HEIGHT: 65 IN | BODY MASS INDEX: 28.49 KG/M2 | WEIGHT: 171 LBS

## 2023-03-13 DIAGNOSIS — Z12.31 ENCOUNTER FOR SCREENING MAMMOGRAM FOR MALIGNANT NEOPLASM OF BREAST: ICD-10-CM

## 2024-01-03 ENCOUNTER — ANNUAL EXAM (OUTPATIENT)
Dept: OBGYN CLINIC | Facility: MEDICAL CENTER | Age: 44
End: 2024-01-03
Payer: COMMERCIAL

## 2024-01-03 VITALS
WEIGHT: 167 LBS | SYSTOLIC BLOOD PRESSURE: 110 MMHG | DIASTOLIC BLOOD PRESSURE: 70 MMHG | HEIGHT: 65 IN | BODY MASS INDEX: 27.82 KG/M2

## 2024-01-03 DIAGNOSIS — Z12.31 ENCOUNTER FOR SCREENING MAMMOGRAM FOR BREAST CANCER: ICD-10-CM

## 2024-01-03 DIAGNOSIS — Z01.419 ENCOUNTER FOR ANNUAL ROUTINE GYNECOLOGICAL EXAMINATION: Primary | ICD-10-CM

## 2024-01-03 DIAGNOSIS — N92.6 IRREGULAR MENSES: ICD-10-CM

## 2024-01-03 PROCEDURE — 99396 PREV VISIT EST AGE 40-64: CPT | Performed by: CLINICAL NURSE SPECIALIST

## 2024-01-03 NOTE — PROGRESS NOTES
"  Subjective:        Elizabeth Laughlin is a 43 y.o. female. Here for Gynecologic Exam (Pap 12/30/21 -/-/Mammo 3/13/23 bi-rads 1 /Maternal grandmother breast cancer )      GYN HPI  Menstrual cycle:   typically regular and monthly but no menses since November.  Having some hot flashes and night sweats.  Vaginal c/o: denies  Urinary c/o: denies  Breast complaints:denies  She does not do self breast Exams    Sexually active: not sexually active  Contraception: N/A  She reports she feels safe at home.     Dietary calcium/vit D  intake: yes  Lifestyle: goes to the gym regularly    Hereditary Cancer Screening  Cancer-related family history includes Breast cancer (age of onset: 60) in her maternal grandmother.    Substance Abuse Screening Completed. See hx and flowsheet.    The following portions of the patient's history were reviewed and updated as appropriate: allergies, current medications, past family history, past medical history, past social history, past surgical history, and problem list.         Review of Systems   Constitutional:  Negative for appetite change, chills, fatigue, fever and unexpected weight change.   HENT: Negative.     Eyes: Negative.    Respiratory:  Negative for chest tightness and shortness of breath.    Cardiovascular:  Negative for chest pain and palpitations.   Gastrointestinal:  Negative for abdominal pain, constipation and vomiting.   Endocrine: Negative for cold intolerance and heat intolerance.   Genitourinary:         As per HPI   Musculoskeletal:  Negative for back pain, joint swelling and neck pain.   Skin:  Negative for color change and rash.   Neurological:  Negative for dizziness, weakness and numbness.   Hematological:  Does not bruise/bleed easily.   Psychiatric/Behavioral: Negative.               Objective:  /70 (BP Location: Left arm, Patient Position: Sitting, Cuff Size: Standard)   Ht 5' 5\" (1.651 m)   Wt 75.8 kg (167 lb)   LMP 11/16/2023 (Approximate)   BMI 27.79 " kg/m²        Physical Exam  Constitutional:       General: She is not in acute distress.     Appearance: Normal appearance.   Genitourinary:      Vulva and rectum normal.      No lesions in the vagina.      Right Labia: No rash or lesions.     Left Labia: No lesions or rash.     No vaginal discharge, erythema, tenderness or bleeding.        Right Adnexa: not tender and no mass present.     Left Adnexa: not tender and no mass present.     No cervical motion tenderness, discharge or friability.      Uterus is not enlarged or tender.      No urethral prolapse present.      Pelvic exam was performed with patient in the lithotomy position.   Breasts:     Breasts are symmetrical.      Right: No inverted nipple, mass, nipple discharge, skin change or tenderness.      Left: No inverted nipple, mass, nipple discharge, skin change or tenderness.   HENT:      Head: Normocephalic and atraumatic.   Cardiovascular:      Rate and Rhythm: Normal rate.      Heart sounds: No murmur heard.  Pulmonary:      Effort: Pulmonary effort is normal.      Breath sounds: Normal breath sounds.   Abdominal:      General: There is no distension.      Palpations: Abdomen is soft.      Tenderness: There is no abdominal tenderness.   Musculoskeletal:         General: Normal range of motion.      Cervical back: Normal range of motion.   Lymphadenopathy:      Cervical: No cervical adenopathy.   Neurological:      Mental Status: She is alert and oriented to person, place, and time.   Skin:     General: Skin is warm and dry.   Psychiatric:         Mood and Affect: Mood normal.         Behavior: Behavior normal.   Vitals reviewed.             Assessment/Plan:           ANNUAL GYN EXAM- Primary  Annual GYN examination completed today.   Health maintenance reviewed/updated as appropriate    HEALTH MAINTENANCE SCREENINGS:    Last Papanicolaou test:  12/30/2021   History of abnormal pap: No  Last mammogram:  03/13/2023    Cervical cancer screen :Previous pap  smears and ASCCP screening guidelines have been reviewed. Pap not indicated today .  Breast Health: Encouraged regular self breast exams  Risk prevention and anticipatory guidance provided including:  Age related Calcium and vitamin D intake  Dietary and lifestyle recommendations based on her age and weight. body mass index is 27.79 kg/m²..    Tobacco and alcohol use, intervention ordered if applicable.   Condom use for prevention of STI's. Declined STI Screening.  Contraception:  N/A- not sexually active    Problem List Items Addressed This Visit       Irregular menses     No menses since November. Periods typically regular. Likely perimenopause. BW ordered to r/o other causes.          Relevant Orders    Prolactin    TSH, 3rd generation with Free T4 reflex    Follicle stimulating hormone     Other Visit Diagnoses       Encounter for annual routine gynecological examination    -  Primary    Encounter for screening mammogram for breast cancer        Relevant Orders    Mammo screening bilateral w 3d & cad            Orders Placed This Encounter   Procedures    Mammo screening bilateral w 3d & cad    Prolactin    TSH, 3rd generation with Free T4 reflex    Follicle stimulating hormone

## 2024-01-03 NOTE — ASSESSMENT & PLAN NOTE
No menses since November. Periods typically regular. Likely perimenopause. BW ordered to r/o other causes.

## 2024-01-06 ENCOUNTER — APPOINTMENT (OUTPATIENT)
Dept: LAB | Facility: MEDICAL CENTER | Age: 44
End: 2024-01-06
Payer: COMMERCIAL

## 2024-01-06 DIAGNOSIS — N92.6 IRREGULAR MENSES: ICD-10-CM

## 2024-01-06 LAB
FSH SERPL-ACNC: 49.2 MIU/ML
PROLACTIN SERPL-MCNC: 11.06 NG/ML (ref 3.34–26.72)
TSH SERPL DL<=0.05 MIU/L-ACNC: 2.94 UIU/ML (ref 0.45–4.5)

## 2024-01-06 PROCEDURE — 84146 ASSAY OF PROLACTIN: CPT

## 2024-01-06 PROCEDURE — 84443 ASSAY THYROID STIM HORMONE: CPT

## 2024-01-06 PROCEDURE — 83001 ASSAY OF GONADOTROPIN (FSH): CPT

## 2024-01-06 PROCEDURE — 36415 COLL VENOUS BLD VENIPUNCTURE: CPT

## 2024-01-31 ENCOUNTER — OFFICE VISIT (OUTPATIENT)
Dept: FAMILY MEDICINE CLINIC | Facility: CLINIC | Age: 44
End: 2024-01-31
Payer: COMMERCIAL

## 2024-01-31 VITALS
TEMPERATURE: 97.7 F | OXYGEN SATURATION: 97 % | SYSTOLIC BLOOD PRESSURE: 118 MMHG | BODY MASS INDEX: 27.32 KG/M2 | RESPIRATION RATE: 16 BRPM | HEIGHT: 65 IN | HEART RATE: 80 BPM | WEIGHT: 164 LBS | DIASTOLIC BLOOD PRESSURE: 80 MMHG

## 2024-01-31 DIAGNOSIS — I83.813 VARICOSE VEINS OF BOTH LOWER EXTREMITIES WITH PAIN: ICD-10-CM

## 2024-01-31 DIAGNOSIS — Z00.00 ROUTINE ADULT HEALTH MAINTENANCE: Primary | ICD-10-CM

## 2024-01-31 PROCEDURE — 99396 PREV VISIT EST AGE 40-64: CPT | Performed by: NURSE PRACTITIONER

## 2024-01-31 NOTE — PROGRESS NOTES
Name: Elizabeth Laughlin      : 1980      MRN: [id number]  Encounter Provider: RYDER Bravo  Encounter Date: 2024   Encounter department: [facility]    Assessment & Plan     1. Routine adult health maintenance  -     CBC and differential; Future  -     Comprehensive metabolic panel; Future  -     Lipid panel; Future  -     TSH, 3rd generation; Future  -     UA w Reflex to Microscopic w Reflex to Culture; Future    2. Varicose veins of both lower extremities with pain  -     Ambulatory Referral to Vascular Surgery; Future    Physical assessment unremarkable. Labs reviewed were WNL also VS were well controlled. Labs given is to complete in 6 mos for possible fu discussion. RTO as needed or for next scheduled appt. All questions answered.      Depression Screening and Follow-up Plan: Patient was screened for depression during today's encounter. They screened negative with a PHQ-2 score of 0.        Subjective        Patient is here for routine follow-up.  To review most recent labs.  To also discuss current state of health and any new problems that they may be experiencing.  Patient states that medications taken as prescribed and very well tolerated no new complaints at this time.          Review of Systems   Constitutional:  Negative for appetite change and fever.   HENT:  Negative for sinus pressure and sore throat.    Eyes:  Negative for pain.   Respiratory:  Negative for shortness of breath.    Cardiovascular:  Negative for chest pain.   Gastrointestinal:  Negative for abdominal pain.   Genitourinary:  Negative for dysuria.   Musculoskeletal:  Negative for arthralgias and myalgias (bl leg pain due to varicose veins).   Skin:  Negative for color change.   Neurological:  Negative for light-headedness.   Psychiatric/Behavioral:  Negative for behavioral problems.        No current outpatient medications on file prior to visit.       Objective     /80 (BP Location:  "Left arm, Patient Position: Sitting, Cuff Size: Standard)   Pulse 80   Temp 97.7 °F (36.5 °C) (Temporal)   Resp 16   Ht 5' 5\" (1.651 m)   Wt 74.4 kg (164 lb)   LMP 11/16/2023 (Approximate)   SpO2 97%   BMI 27.29 kg/m²     Physical Exam  Vitals and nursing note reviewed.   Constitutional:       General: She is not in acute distress.     Appearance: She is well-developed. She is not diaphoretic.   HENT:      Head: Normocephalic and atraumatic.      Right Ear: External ear normal.      Left Ear: External ear normal.      Mouth/Throat:      Pharynx: Oropharynx is clear.   Eyes:      Pupils: Pupils are equal, round, and reactive to light.   Cardiovascular:      Rate and Rhythm: Normal rate and regular rhythm.      Heart sounds: Normal heart sounds.      Comments: Visible varicosities on knee cap most painful  Pulmonary:      Effort: Pulmonary effort is normal.      Breath sounds: Normal breath sounds.   Abdominal:      Palpations: Abdomen is soft.   Musculoskeletal:         General: Normal range of motion.      Cervical back: Normal range of motion and neck supple.   Skin:     General: Skin is dry.   Neurological:      Mental Status: She is alert and oriented to person, place, and time.   Psychiatric:         Behavior: Behavior normal.         Thought Content: Thought content normal.       RYDER Bravo    "

## 2024-02-14 ENCOUNTER — CONSULT (OUTPATIENT)
Dept: VASCULAR SURGERY | Facility: CLINIC | Age: 44
End: 2024-02-14
Payer: COMMERCIAL

## 2024-02-14 VITALS
BODY MASS INDEX: 28.16 KG/M2 | HEIGHT: 65 IN | HEART RATE: 92 BPM | OXYGEN SATURATION: 97 % | DIASTOLIC BLOOD PRESSURE: 80 MMHG | SYSTOLIC BLOOD PRESSURE: 118 MMHG | WEIGHT: 169 LBS

## 2024-02-14 DIAGNOSIS — I83.813 VARICOSE VEINS OF BOTH LOWER EXTREMITIES WITH PAIN: Primary | ICD-10-CM

## 2024-02-14 PROCEDURE — 99203 OFFICE O/P NEW LOW 30 MIN: CPT | Performed by: NURSE PRACTITIONER

## 2024-02-14 NOTE — ASSESSMENT & PLAN NOTE
44-year-old female without significant medical history presents with complaints of BLE symptomatic varicose veins.    -Presents with bilateral symptomatic varicose veins.  Describes as throbbing, heavy, aching tired legs.  -Patient has worn compression without relief of symptoms  -Reports pain L>R (worse over L knee), however she feels RLE swells more  -On exam today mild edema bilaterally  -BLE small, dilated varicosities to anterior thighs, left anterior knee  -Palpable distal pulses bilateral    -Reports family history of varicose veins on maternal side  -Personal history of 3 pregnancies  -Family history of DVT, no personal DVT    Plan:  -Lower extremity venous duplex with reflux measurements- bilateral.  -Continue daily compression.  On a.m., off in p.m.  -Frequent ambulation, leg elevation rest.  -Return to office with vascular surgeon to review imaging and make further recommendations if indicated.

## 2024-02-14 NOTE — PROGRESS NOTES
Assessment/Plan:    Varicose veins of both lower extremities with pain  44-year-old female without significant medical history presents with complaints of BLE symptomatic varicose veins.    -Presents with bilateral symptomatic varicose veins.  Describes as throbbing, heavy, aching tired legs.  -Patient has worn compression without relief of symptoms  -Reports pain L>R (worse over L knee), however she feels RLE swells more  -On exam today mild edema bilaterally  -BLE small, dilated varicosities to anterior thighs, left anterior knee  -Palpable distal pulses bilateral    -Reports family history of varicose veins on maternal side  -Personal history of 3 pregnancies  -Family history of DVT, no personal DVT    Plan:  -Lower extremity venous duplex with reflux measurements- bilateral.  -Continue daily compression.  On a.m., off in p.m.  -Frequent ambulation, leg elevation rest.  -Return to office with vascular surgeon to review imaging and make further recommendations if indicated.       Diagnoses and all orders for this visit:    Varicose veins of both lower extremities with pain  -     Ambulatory Referral to Vascular Surgery  -     VAS Lower extremity venous insufficiency duplex, bilateral w/ measurements; Future          Subjective:      Patient ID: Elizabeth Laughlin is a 44 y.o. female.    Pt is new to office referred by PCP. Pt has KAYLEN LE bulging veins. L>R. Pt c/o aches, tiredness, heaviness, swelling and pain. Pt has worn compression without relief. She elevates depending on how bad the swelling is. Pt is a nonsmoker.     HPI    Elizabeth Laughlin is seen for evaluation of: [x]Varicose veins/legs  []Spider veins/legs  []Spider veins/face  []Venous stasis ulcer  []Superficial thrombophlebitis  []Other -      She complains of []none  [x]bulging veins  []dilated veins  []discolored veins         There is []no edema              [x]right leg edema  []left leg edema       []bilateral lower extremity edema     There is    "[]no leg pain          []right leg pain  [x]left leg pain         []bilateral leg pain  []bilateral leg pain; L>R   []bilateral leg pain; R>L     Pain is described as [x]aching              []itching  []sharp                []burning  [x]throbbing         []stinging  [x]heavy                []dull  []other -      Symptoms have been ongoing for:  Many years   There is  [x]no pertinent medical history  []history of DVT  []PE  []superficial venous thrombosis     Prior treatment includes []none  []EVLT  []OTC stockings  [x]prescription compression stockings  []vein ligation  []vein stripping  []stab phlebectomy  []sclerotherapy injections  []Other -      Current treatment includes []none  [x]compression socks  []avoiding tight clothing  []leg elevation  []rest  []exercise  []weight management  []skin care  []periodic evaluation   []Other-     Treatment has been []effective  [x]ineffective       The following portions of the patient's history were reviewed and updated as appropriate: allergies, current medications, past family history, past medical history, past social history, past surgical history, and problem list.    Review of Systems   Constitutional: Negative.    HENT: Negative.     Eyes: Negative.    Cardiovascular: Negative.    Gastrointestinal: Negative.    Endocrine: Negative.    Genitourinary: Negative.    Musculoskeletal: Negative.    Skin: Negative.    Allergic/Immunologic: Negative.    Neurological: Negative.    Hematological: Negative.    Psychiatric/Behavioral: Negative.         I have reviewed and made appropriate changes to the review of systems input by the medical assistant.    Objective:      /80 (BP Location: Right arm, Patient Position: Sitting, Cuff Size: Large)   Pulse 92   Ht 5' 5\" (1.651 m)   Wt 76.7 kg (169 lb)   SpO2 97%   BMI 28.12 kg/m²          Physical Exam  Vitals reviewed.   Constitutional:       General: She is not in acute distress.     Appearance: Normal appearance. She is " "normal weight.   Cardiovascular:      Rate and Rhythm: Normal rate.      Pulses: Normal pulses.           Posterior tibial pulses are 2+ on the right side and 2+ on the left side.   Pulmonary:      Effort: Pulmonary effort is normal. No respiratory distress.   Musculoskeletal:         General: Normal range of motion.      Right lower leg: No edema.      Left lower leg: No edema.   Skin:     General: Skin is warm.      Capillary Refill: Capillary refill takes less than 2 seconds.      Comments: BLE small, dilated varicosities   Neurological:      Mental Status: She is alert and oriented to person, place, and time.      Sensory: No sensory deficit.      Motor: No weakness.   Psychiatric:         Behavior: Behavior normal.                         I have spent a total time of 25 minutes on 02/14/24 in caring for this patient including Instructions for management, Patient and family education, Importance of tx compliance, Impressions, Documenting in the medical record, Reviewing / ordering tests, medicine, procedures  , and Obtaining or reviewing history  .      Vitals:    02/14/24 1443   BP: 118/80   BP Location: Right arm   Patient Position: Sitting   Cuff Size: Large   Pulse: 92   SpO2: 97%   Weight: 76.7 kg (169 lb)   Height: 5' 5\" (1.651 m)       Patient Active Problem List   Diagnosis    Family history of blood clots    Mixed hyperlipidemia    Irregular menses    Varicose veins of both lower extremities with pain       Past Surgical History:   Procedure Laterality Date    CHOLECYSTECTOMY         Family History   Problem Relation Age of Onset    No Known Problems Mother     Heart disease Father     No Known Problems Daughter     No Known Problems Son     Breast cancer Maternal Grandmother 60    No Known Problems Maternal Aunt     No Known Problems Maternal Aunt     No Known Problems Maternal Aunt     No Known Problems Maternal Aunt     No Known Problems Paternal Aunt        Social History     Socioeconomic History "    Marital status: /Civil Union     Spouse name: Not on file    Number of children: Not on file    Years of education: Not on file    Highest education level: Not on file   Occupational History    Not on file   Tobacco Use    Smoking status: Never    Smokeless tobacco: Never   Vaping Use    Vaping status: Never Used   Substance and Sexual Activity    Alcohol use: Yes     Alcohol/week: 2.0 standard drinks of alcohol     Types: 2 Glasses of wine per week    Drug use: Never    Sexual activity: Not Currently   Other Topics Concern    Not on file   Social History Narrative    Not on file     Social Determinants of Health     Financial Resource Strain: Not on file   Food Insecurity: Not on file   Transportation Needs: Not on file   Physical Activity: Not on file   Stress: Not on file   Social Connections: Not on file   Intimate Partner Violence: Not on file   Housing Stability: Not on file       No Known Allergies    No current outpatient medications on file.

## 2024-02-14 NOTE — PATIENT INSTRUCTIONS
Lower extremity venous duplex with reflux measurements- bilateral.    Continue daily compression.  On a.m., off in p.m.  Frequent ambulation, leg elevation rest.    Return to office with vascular surgeon to review imaging and make further recommendations if indicated.    Varicose Veins   AMBULATORY CARE:   Varicose veins  are veins that become large, twisted, and swollen. They are common on the back of the calves, knees, and thighs. Varicose veins are caused by valves in your veins that do not work properly. This causes blood to collect and increase pressure in the veins of your legs. The increased pressure causes your veins to stretch, get larger, swell, and twist.       Common signs and symptoms:   Blue or purple bulging veins in your legs    Pain, swelling, or muscle cramps in your legs    Feeling of tiredness or heaviness in your legs    Seek care immediately if:   You have an injury that has broken your skin and caused your varicose veins to bleed.    Your leg is swollen and hard.    Your legs or feet are turning blue or black.    Your leg feels warm, tender, and painful. It may look swollen and red.    You have a wound on your leg that does not heal or is infected.    Call your doctor if:   You have pain in your leg that does not go away or gets worse.    You have large bruising on your legs.    You have a rash on your leg.    Your symptoms keep you from doing your daily activities.    You have questions or concerns about your condition or care.    Treatment of varicose veins  aims to decrease symptoms, improve appearance, and prevent further problems. Treatment will depend on which veins are affected and how severe your symptoms are. You may need a procedure to treat or remove your varicose veins. For example, your provider may inject a solution or use a laser to close the varicose veins. Surgery to remove long veins may also be done. Ask your provider for more information about procedures used to treat  varicose veins.  Manage varicose veins:   Maintain a healthy weight.  Excess weight or obesity can make your varicose veins worse. Ask your provider what a healthy weight is for you. Ask your provider to help you create a weight loss plan, if needed.    Wear pressure stockings as directed.  The stockings are tight and put pressure on your legs. This improves blood flow and helps prevent clots.         Elevate your legs.  Keep them above the level of your heart as often as you can. Prop your legs on pillows or blankets to keep them elevated comfortably. This will help blood flow back to your heart and decrease pain and swelling.         Get regular exercise.  Talk to your provider about the best exercise plan for you. Exercise can improve blood flow to your legs and feet.       Prevent your varicose veins from getting worse:   Do not sit or stand for long periods of time, if possible.  This can cause the blood to collect in your legs and make your symptoms worse. Bend or rotate your ankles several times every hour. Walk around for a few minutes every hour to get blood moving in your legs.    Do not smoke.  Nicotine and other chemicals in cigarettes and cigars can cause lung damage. Ask your healthcare provider for information if you currently smoke and need help to quit. E-cigarettes or smokeless tobacco still contain nicotine. Talk to your provider before you use these products.    Do not wear tight clothes or high-heeled shoes.  Avoid clothes that are tight around your waist and knees. Wear clothes that fit well to help blood flow in your legs. Your symptoms may get worse if you wear high-heeled shoes.    Do not cross your legs when you sit.  This decreases blood flow to your feet and can make your symptoms worse. Make sure your feet can reach the chair's footrest or ground when you sit.    Follow up with your doctor as directed:  Write down your questions so you remember to ask them during your visits.  ©  Copyright [employer] 2023 Information is for End User's use only and may not be sold, redistributed or otherwise used for commercial purposes.  The above information is an  only. It is not intended as medical advice for individual conditions or treatments. Talk to your doctor, nurse or pharmacist before following any medical regimen to see if it is safe and effective for you.

## 2024-03-09 ENCOUNTER — APPOINTMENT (OUTPATIENT)
Dept: LAB | Facility: MEDICAL CENTER | Age: 44
End: 2024-03-09
Payer: COMMERCIAL

## 2024-03-09 DIAGNOSIS — Z00.00 ROUTINE ADULT HEALTH MAINTENANCE: ICD-10-CM

## 2024-03-09 LAB
ALBUMIN SERPL BCP-MCNC: 3.9 G/DL (ref 3.5–5)
ALP SERPL-CCNC: 38 U/L (ref 34–104)
ALT SERPL W P-5'-P-CCNC: 8 U/L (ref 7–52)
ANION GAP SERPL CALCULATED.3IONS-SCNC: 7 MMOL/L
AST SERPL W P-5'-P-CCNC: 13 U/L (ref 13–39)
BACTERIA UR QL AUTO: NORMAL /HPF
BASOPHILS # BLD AUTO: 0.07 THOUSANDS/ÂΜL (ref 0–0.1)
BASOPHILS NFR BLD AUTO: 1 % (ref 0–1)
BILIRUB SERPL-MCNC: 0.38 MG/DL (ref 0.2–1)
BILIRUB UR QL STRIP: NEGATIVE
BUN SERPL-MCNC: 19 MG/DL (ref 5–25)
CALCIUM SERPL-MCNC: 9.9 MG/DL (ref 8.4–10.2)
CHLORIDE SERPL-SCNC: 104 MMOL/L (ref 96–108)
CHOLEST SERPL-MCNC: 165 MG/DL
CLARITY UR: CLEAR
CO2 SERPL-SCNC: 29 MMOL/L (ref 21–32)
COLOR UR: ABNORMAL
CREAT SERPL-MCNC: 0.75 MG/DL (ref 0.6–1.3)
EOSINOPHIL # BLD AUTO: 0.11 THOUSAND/ÂΜL (ref 0–0.61)
EOSINOPHIL NFR BLD AUTO: 1 % (ref 0–6)
ERYTHROCYTE [DISTWIDTH] IN BLOOD BY AUTOMATED COUNT: 12.9 % (ref 11.6–15.1)
GFR SERPL CREATININE-BSD FRML MDRD: 96 ML/MIN/1.73SQ M
GLUCOSE P FAST SERPL-MCNC: 86 MG/DL (ref 65–99)
GLUCOSE UR STRIP-MCNC: NEGATIVE MG/DL
HCT VFR BLD AUTO: 39 % (ref 34.8–46.1)
HDLC SERPL-MCNC: 69 MG/DL
HGB BLD-MCNC: 12.7 G/DL (ref 11.5–15.4)
HGB UR QL STRIP.AUTO: NEGATIVE
IMM GRANULOCYTES # BLD AUTO: 0.05 THOUSAND/UL (ref 0–0.2)
IMM GRANULOCYTES NFR BLD AUTO: 0 % (ref 0–2)
KETONES UR STRIP-MCNC: NEGATIVE MG/DL
LDLC SERPL CALC-MCNC: 84 MG/DL (ref 0–100)
LEUKOCYTE ESTERASE UR QL STRIP: NEGATIVE
LYMPHOCYTES # BLD AUTO: 2.14 THOUSANDS/ÂΜL (ref 0.6–4.47)
LYMPHOCYTES NFR BLD AUTO: 19 % (ref 14–44)
MCH RBC QN AUTO: 28.9 PG (ref 26.8–34.3)
MCHC RBC AUTO-ENTMCNC: 32.6 G/DL (ref 31.4–37.4)
MCV RBC AUTO: 89 FL (ref 82–98)
MONOCYTES # BLD AUTO: 0.59 THOUSAND/ÂΜL (ref 0.17–1.22)
MONOCYTES NFR BLD AUTO: 5 % (ref 4–12)
NEUTROPHILS # BLD AUTO: 8.16 THOUSANDS/ÂΜL (ref 1.85–7.62)
NEUTS SEG NFR BLD AUTO: 74 % (ref 43–75)
NITRITE UR QL STRIP: NEGATIVE
NON-SQ EPI CELLS URNS QL MICRO: NORMAL /HPF
NONHDLC SERPL-MCNC: 96 MG/DL
NRBC BLD AUTO-RTO: 0 /100 WBCS
PH UR STRIP.AUTO: 6 [PH]
PLATELET # BLD AUTO: 299 THOUSANDS/UL (ref 149–390)
PMV BLD AUTO: 10 FL (ref 8.9–12.7)
POTASSIUM SERPL-SCNC: 4.2 MMOL/L (ref 3.5–5.3)
PROT SERPL-MCNC: 6.5 G/DL (ref 6.4–8.4)
PROT UR STRIP-MCNC: ABNORMAL MG/DL
RBC # BLD AUTO: 4.4 MILLION/UL (ref 3.81–5.12)
RBC #/AREA URNS AUTO: NORMAL /HPF
SODIUM SERPL-SCNC: 140 MMOL/L (ref 135–147)
SP GR UR STRIP.AUTO: 1.02 (ref 1–1.03)
TRIGL SERPL-MCNC: 61 MG/DL
TSH SERPL DL<=0.05 MIU/L-ACNC: 3.47 UIU/ML (ref 0.45–4.5)
UROBILINOGEN UR STRIP-ACNC: <2 MG/DL
WBC # BLD AUTO: 11.12 THOUSAND/UL (ref 4.31–10.16)
WBC #/AREA URNS AUTO: NORMAL /HPF

## 2024-03-09 PROCEDURE — 84443 ASSAY THYROID STIM HORMONE: CPT

## 2024-03-09 PROCEDURE — 36415 COLL VENOUS BLD VENIPUNCTURE: CPT

## 2024-03-09 PROCEDURE — 80053 COMPREHEN METABOLIC PANEL: CPT

## 2024-03-09 PROCEDURE — 81001 URINALYSIS AUTO W/SCOPE: CPT

## 2024-03-09 PROCEDURE — 85025 COMPLETE CBC W/AUTO DIFF WBC: CPT

## 2024-03-09 PROCEDURE — 80061 LIPID PANEL: CPT

## 2024-04-18 ENCOUNTER — HOSPITAL ENCOUNTER (OUTPATIENT)
Dept: MAMMOGRAPHY | Facility: CLINIC | Age: 44
Discharge: HOME | End: 2024-04-18
Payer: COMMERCIAL

## 2024-04-18 VITALS — HEIGHT: 65 IN | WEIGHT: 169 LBS | BODY MASS INDEX: 28.16 KG/M2

## 2024-04-18 DIAGNOSIS — Z12.31 ENCOUNTER FOR SCREENING MAMMOGRAM FOR BREAST CANCER: ICD-10-CM

## 2024-04-18 PROCEDURE — 77067 SCR MAMMO BI INCL CAD: CPT

## 2024-04-18 PROCEDURE — 77063 BREAST TOMOSYNTHESIS BI: CPT

## 2024-04-23 ENCOUNTER — HOSPITAL ENCOUNTER (OUTPATIENT)
Dept: MAMMOGRAPHY | Facility: CLINIC | Age: 44
Discharge: HOME | End: 2024-04-23
Payer: COMMERCIAL

## 2024-04-23 VITALS — WEIGHT: 169 LBS | HEIGHT: 65 IN | BODY MASS INDEX: 28.16 KG/M2

## 2024-04-23 DIAGNOSIS — R92.8 ABNORMAL MAMMOGRAM: ICD-10-CM

## 2024-04-23 PROCEDURE — 77065 DX MAMMO INCL CAD UNI: CPT

## 2024-04-25 NOTE — PROGRESS NOTES
Assessment/Plan:    Varicose veins of both lower extremities with pain  Bilateral lower extremity varicose vein disease with venous insufficiency.  Right side examines with increased swelling.  Varicose veins noted bilaterally.  The patient has been compliant with compression therapy for greater than 3 months.  She experiences daily discomfort.  She is interested in a surgical solution.  I offered right lower extremity EVLT with vein stripping.  Informed consent was obtained in the office.    Subjective:      Patient ID: Elizabeth Laughlin is a 44 y.o. female.    Patient presents today to review reflux study done 4/26/24. BLE VV symptomatic with throbbing, aching, tiredness and heaviness. Wearing compression on occasion.     ADAM Rosen is a pleasant 44-year-old female who works in the [employer] house and also picks up shifts as a  some evenings.  She wears compression therapy for her lower extremity varicose vein disease however has found refractory pain and swelling primarily in her right lower extremity.  She has never developed wounds.  She has however felt itching, redness and swelling after long shifts.  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  She is interested in a surgical solution and we discussed endovenous laser therapy with vein stripping at length.  She is looking to proceed toward the end of summer.  She is aware that ongoing compression therapy for the first several months after EVLT will be helpful.  Her insufficiency studies demonstrate insufficiency of the greater saphenous systems bilaterally.  Her left deep system is also incompetent.  It is likely we will need to consider left lower extremity EVLT in the future.  She, at this time, is more symptomatic on the right.    Review of Systems   Constitutional: Negative.   "  HENT: Negative.     Eyes: Negative.    Respiratory: Negative.     Cardiovascular:  Positive for leg swelling.        Painful veins    Gastrointestinal: Negative.    Endocrine: Negative.    Genitourinary: Negative.    Musculoskeletal: Negative.    Skin: Negative.    Allergic/Immunologic: Negative.    Neurological: Negative.    Hematological: Negative.    Psychiatric/Behavioral: Negative.         Objective:  /70 (BP Location: Left arm, Patient Position: Sitting)   Pulse 80   Ht 5' 5\" (1.651 m)   Wt 77.1 kg (170 lb)   LMP 03/20/2024   BMI 28.29 kg/m²      Physical Exam  Constitutional:       Appearance: Normal appearance.   HENT:      Head: Normocephalic and atraumatic.      Nose: No congestion or rhinorrhea.   Eyes:      Extraocular Movements: Extraocular movements intact.      Pupils: Pupils are equal, round, and reactive to light.   Cardiovascular:      Rate and Rhythm: Normal rate and regular rhythm.      Comments: Varicose veins of the right thigh and calf.  A prominent varicosity across the patella of the left leg.  No wounds.  Slightly enlarged and swollen right lower extremity compared to left.  No pitting edema.  Skin changes consistent with chronic venous insufficiency to the right lower extremity with mild erythema noted.  Pulmonary:      Effort: Pulmonary effort is normal.      Breath sounds: No stridor.   Abdominal:      General: There is no distension.      Tenderness: There is no abdominal tenderness.   Musculoskeletal:         General: No swelling. Normal range of motion.      Cervical back: Normal range of motion and neck supple.      Right lower leg: Edema present.   Skin:     General: Skin is warm.      Coloration: Skin is not jaundiced.   Neurological:      General: No focal deficit present.      Mental Status: She is alert and oriented to person, place, and time.   Psychiatric:         Mood and Affect: Mood normal.         Behavior: Behavior normal.       EVLT Operative Scheduling " Information:    Hospital:  [facility]    Physician:  Me    Surgery: RLE EVLT with vein stripping    Urgency:  Standard    Level:  Level 4: Outpatients to be scheduled for screening procedures and elective surgery that can be delayed for longer than one month without reasonable expectation of detriment to patient.    Case Length:  1.5 hours    Post-op Bed:  Outpatient    OR Table:  Standard    Equipment Needs:  Vascular technologist and Product/Implant: EVLT    Medication Instructions:  None    Hydration:  No    Contrast Allergy:  No    Venous Clinical Severity Scores (VCSS)  Item Absent   (0 points) Mild   (1 point) Moderate   (2 points) Severe   (3 points)   Pain [] None [] Occasional [] Daily [x] Daily limiting   Varicose veins [] None [] Few [] Calf or thigh [x] Calf and thigh   Venous edema [] None [] Foot and ankle [] Above ankle, below knee [x] To knee of above   Skin pigmentation [] None [] Perimalleolar [x] Diffuse, lower 1/3 calf [] Wider, above lower 1/3 calf   Inflammation [x] None [] Perimalleolar [] Diffuse, lower 1/3 calf [] Wider, above lower 1/3 calf   Induration [x] None [] Perimalleolar [] Diffuse, lower 1/3 calf [] Wider, above lower 1/3 calf   No. active ulcers [x] None [] 1 [] 2 [] ?3   Active ulcer size [x] None [] <2 cm [] 2 - 6 cm [] >6 cm   Ulcer duration [x] None [] <3 months [] 3 - 12 months [] >1 year   Compression therapy [] None [] Intermittent [] Most days [x] Fully comply   Total 14          CEAP Clinical Classification  [x] Symptomatic   [] Asymptomatic     [] Class 0 No visible or palpable signs of venous disease   [] Class 1 Telangiectasies or reticular veins   [] Class 2 Varicose veins; distinguished from reticular veins by a diameter of 3mm or more   [] Class 3 Edema   [] Class 4 Changes in skin and subcutaneous tissue secondary to CVD    [x] Class 4a Pigmentation or eczema   [] Class 4b Lipodermatosclerosis or atrophie ammon   [] Class 5 Healed venous ulcer   [] Class 6 Active  venous ulcer

## 2024-04-26 ENCOUNTER — HOSPITAL ENCOUNTER (OUTPATIENT)
Dept: NON INVASIVE DIAGNOSTICS | Facility: CLINIC | Age: 44
Discharge: HOME | End: 2024-04-26
Payer: COMMERCIAL

## 2024-04-26 DIAGNOSIS — I83.813 VARICOSE VEINS OF BOTH LOWER EXTREMITIES WITH PAIN: ICD-10-CM

## 2024-04-26 PROCEDURE — 93970 EXTREMITY STUDY: CPT

## 2024-04-27 PROCEDURE — 93970 EXTREMITY STUDY: CPT | Performed by: SURGERY

## 2024-04-29 ENCOUNTER — OFFICE VISIT (OUTPATIENT)
Dept: VASCULAR SURGERY | Facility: CLINIC | Age: 44
End: 2024-04-29
Payer: COMMERCIAL

## 2024-04-29 VITALS
BODY MASS INDEX: 28.32 KG/M2 | HEIGHT: 65 IN | WEIGHT: 170 LBS | SYSTOLIC BLOOD PRESSURE: 110 MMHG | HEART RATE: 80 BPM | DIASTOLIC BLOOD PRESSURE: 70 MMHG

## 2024-04-29 DIAGNOSIS — I83.813 VARICOSE VEINS OF BOTH LOWER EXTREMITIES WITH PAIN: Primary | ICD-10-CM

## 2024-04-29 PROCEDURE — 99214 OFFICE O/P EST MOD 30 MIN: CPT | Performed by: SURGERY

## 2024-04-29 RX ORDER — CHLORHEXIDINE GLUCONATE ORAL RINSE 1.2 MG/ML
15 SOLUTION DENTAL ONCE
OUTPATIENT
Start: 2024-04-29 | End: 2024-04-29

## 2024-04-29 NOTE — ASSESSMENT & PLAN NOTE
Bilateral lower extremity varicose vein disease with venous insufficiency.  Right side examines with increased swelling.  Varicose veins noted bilaterally.  The patient has been compliant with compression therapy for greater than 3 months.  She experiences daily discomfort.  She is interested in a surgical solution.  I offered right lower extremity EVLT with vein stripping.  Informed consent was obtained in the office.

## 2024-04-29 NOTE — PATIENT INSTRUCTIONS
1. Varicose veins of both lower extremities with pain  Assessment & Plan:  Bilateral lower extremity varicose vein disease with venous insufficiency.  Right side examines with increased swelling.  Varicose veins noted bilaterally.  The patient has been compliant with compression therapy for greater than 3 months.  She experiences daily discomfort.  She is interested in a surgical solution.  I offered right lower extremity EVLT with vein stripping.  Informed consent was obtained in the office.

## 2024-04-29 NOTE — LETTER
April 29, 2024     RYDER Iglesias  [address]  [facility] [address]    Patient: Elizabeth Laughlin   YOB: 1980   Date of Visit: 4/29/2024       Dear Dr. Valenzuela:    Thank you for referring Elizabeth Laughlin to me for evaluation. Below are my notes for this consultation.    If you have questions, please do not hesitate to call me. I look forward to following your patient along with you.         Sincerely,        Chuck Jung MD        CC: Elizabeth Laughlin

## 2024-05-01 ENCOUNTER — PREP FOR PROCEDURE (OUTPATIENT)
Dept: VASCULAR SURGERY | Facility: CLINIC | Age: 44
End: 2024-05-01

## 2024-05-01 ENCOUNTER — TELEPHONE (OUTPATIENT)
Dept: VASCULAR SURGERY | Facility: CLINIC | Age: 44
End: 2024-05-01

## 2024-05-01 DIAGNOSIS — I83.813 VARICOSE VEINS OF BOTH LOWER EXTREMITIES WITH PAIN: Primary | ICD-10-CM

## 2024-05-01 NOTE — TELEPHONE ENCOUNTER
Verified patient's insurance   CONFIRMED - Patient's insurance is [employer]  Is patient requesting a call when authorization has been obtained? Patient did not request a call.    Surgery Date: 9/11/24  Primary Surgeon: CAP // Chuck Jung (NPI: [id number])  Assisting Surgeon: Not Applicable (N/A)  Facility: [facility] (Tax: [id number] / NPI: [id number])  Inpatient / Outpatient: Outpatient  Level: 4    Clearance Received: No clearance ordered.  Consent Received: Yes, scanned into Epic on 5/1/24.  Medication Hold / Last Dose: Not Applicable (N/A)  IR Notified: Not Applicable (N/A)  Rep. Notified:  Vas tech notified 5/1/24  Equipment Needs: Not Applicable (N/A)  Vas Lab Requested: Not Applicable (N/A)  Patient Contacted: 4/29/24 during patient hours    Diagnosis: I83.813  Procedure/ CPT Code(s): (EVLT) Endovenous Laser Treatment of the right upper leg // CPT: 46169Axtk Stripping - 98471    For varicose vein related procedures:   Last LEVDR:  4/26/24, patient's LEVDR was completed within 12-months of their procedure date.  CEAP Classification:  4a  VCSS:  14    Post Operative Date/ Time: TBD     *Please review medication hold(s), PATs, and check H&P with patient.*  PATIENT WAS MAILED SURGERY/SHOWERING/DISCHARGE/COVID INSTRUCTIONS AFTER REVIEWING WITH THEM VIA PHONE CALL.

## 2024-10-22 ENCOUNTER — HOSPITAL ENCOUNTER (OUTPATIENT)
Dept: MAMMOGRAPHY | Facility: CLINIC | Age: 44
Discharge: HOME | End: 2024-10-22
Payer: COMMERCIAL

## 2024-10-22 VITALS — HEIGHT: 65 IN | BODY MASS INDEX: 28.32 KG/M2 | WEIGHT: 170 LBS

## 2024-10-22 DIAGNOSIS — R92.8 ABNORMAL MAMMOGRAM: ICD-10-CM

## 2024-10-22 PROCEDURE — 77065 DX MAMMO INCL CAD UNI: CPT

## 2024-10-22 NOTE — RESULT ENCOUNTER NOTE
Diagnostic right mammogram shows extremely dense breast tissue. LTC of 27.05%  Stable calcifications noted in right breast .   Low suspicion and recommend a follow up diagnostic mammogram in 6 months. This has already been ordered (as a bilateral mammogram) and scheduled.

## 2025-01-06 ENCOUNTER — ANNUAL EXAM (OUTPATIENT)
Dept: OBGYN CLINIC | Facility: MEDICAL CENTER | Age: 45
End: 2025-01-06
Payer: COMMERCIAL

## 2025-01-06 VITALS
WEIGHT: 179 LBS | BODY MASS INDEX: 29.82 KG/M2 | SYSTOLIC BLOOD PRESSURE: 118 MMHG | DIASTOLIC BLOOD PRESSURE: 66 MMHG | HEIGHT: 65 IN

## 2025-01-06 DIAGNOSIS — Z01.419 ENCOUNTER FOR GYNECOLOGICAL EXAMINATION (GENERAL) (ROUTINE) WITHOUT ABNORMAL FINDINGS: Primary | ICD-10-CM

## 2025-01-06 DIAGNOSIS — Z91.89 AT HIGH RISK FOR BREAST CANCER: ICD-10-CM

## 2025-01-06 DIAGNOSIS — R92.343 EXTREMELY DENSE TISSUE OF BOTH BREASTS ON MAMMOGRAPHY: ICD-10-CM

## 2025-01-06 DIAGNOSIS — Z12.11 SCREEN FOR COLON CANCER: ICD-10-CM

## 2025-01-06 PROCEDURE — 99396 PREV VISIT EST AGE 40-64: CPT | Performed by: CLINICAL NURSE SPECIALIST

## 2025-01-06 NOTE — PROGRESS NOTES
"Name: Elizabeth Laughlin      : 1980      MRN: [id number]  Encounter Provider: RYDER Mejia  Encounter Date: 2025   Encounter department: [facility] [place]      Assessment & Plan  Encounter for gynecological examination (general) (routine) without abnormal findings    Annual GYN examination completed today.   Health maintenance reviewed/updated as appropriate  Risk prevention and anticipatory guidance provided including:  Encouraged regular self breast exams and to call with changes   Age related Calcium and vitamin D intake  Dietary and lifestyle recommendations based on her age and weight. body mass index is 29.79 kg/m²..    Tobacco and alcohol use, intervention ordered if applicable.   Condom use for prevention of STI's.       Extremely dense tissue of both breasts on mammography  See A&P for high risk breast cancer   Orders:  •  MRI breast bilateral w and wo contrast w cad; Future    At high risk for breast cancer  Discussed her breast density rating  \"Extremely dense\", as well as Increased risk of breast ca- lifetime T/C is 27+%. Recommended to have adjunctive screening with breast MRI supplement Mammo.  Encouraged pt to check with insurance.    Orders:  •  MRI breast bilateral w and wo contrast w cad; Future    Screen for colon cancer  Counseled briefly on options for screening including Colonoscopy (gold standard),  Cologuard or FIT testing. Referred to GI for consult  Orders:  •  Ambulatory Referral to Gastroenterology; Future              Subjective:      History of Present Illness     Elizabeth Laughlin is a 44 y.o. female. Here for Gynecologic Exam (Pap 21 -/-/Birth control/Std testing/Mammo 10/22/24 / scheduled /Colonoscopy /Materna grandmother breast cancer )      Patient reports irregular menstrual pattern, every few months. Occassionally on the heavy.   She denies any C/O abnormal vaginal discharge or irritation. Denies Urinary " "complaints or breast changes    Sexually active: not sexually active in the lst few years Contraception: N/A    She reports she feels safe at home.   Lifestyle/exercise: walks when weather OK  Dietary calcium/vit D  intake: adequate    HEALTH MAINTENANCE SCREENINGS:     Previous pap smears and ASCCP screening guidelines have been reviewed.   Last Pap:  12/30/2021; Next due 2026  History of abnormal pap: No,  Last mammogram:  04/18/2024  Last Colon Cancer Screening: colonoscopy: Not on file Cologuard:Not on file.    Hereditary Cancer Screening  FH Breast/Ovarian cancer: MGM w/ breast  FH Uterine cancer: Denies  FH Colon cancer: Denies    Substance Abuse Screening Completed. See hx and flowsheet.  Review of Systems   Constitutional:  Negative for appetite change, chills, fatigue, fever and unexpected weight change.   HENT: Negative.     Eyes: Negative.    Respiratory:  Negative for chest tightness and shortness of breath.    Cardiovascular:  Negative for chest pain and palpitations.   Gastrointestinal:  Negative for abdominal pain, constipation and vomiting.   Endocrine: Negative for cold intolerance and heat intolerance.   Genitourinary:         As per HPI   Musculoskeletal:  Negative for back pain, joint swelling and neck pain.   Skin:  Negative for color change and rash.   Neurological:  Negative for dizziness, weakness and numbness.   Hematological:  Does not bruise/bleed easily.   Psychiatric/Behavioral: Negative.       The following portions of the patient's history were reviewed and updated as appropriate: allergies, current medications, past family history, past medical history, past social history, past surgical history, and problem list.         Objective   /66 (BP Location: Left arm, Patient Position: Sitting, Cuff Size: Standard)   Ht 5' 5\" (1.651 m)   Wt 81.2 kg (179 lb)   LMP 10/20/2024 (Approximate)   BMI 29.79 kg/m²     Physical Exam  Constitutional:       General: She is not in acute " distress.     Appearance: Normal appearance.   Genitourinary:      Vulva and rectum normal.      No lesions in the vagina.      Right Labia: No rash or lesions.     Left Labia: No lesions or rash.     No vaginal discharge, erythema, tenderness or bleeding.        Right Adnexa: not tender and no mass present.     Left Adnexa: not tender and no mass present.     No cervical motion tenderness, discharge or friability.      Uterus is not enlarged or tender.      No urethral prolapse present.      Pelvic Floor: Levator muscle strength is 4/5.     Levator ani not tender and obturator internus not tender.     Pelvic exam was performed with patient in the lithotomy position.   Breasts:     Breasts are symmetrical.      Right: No inverted nipple, mass, nipple discharge, skin change or tenderness.      Left: No inverted nipple, mass, nipple discharge, skin change or tenderness.   HENT:      Head: Normocephalic and atraumatic.   Cardiovascular:      Rate and Rhythm: Normal rate.      Heart sounds: No murmur heard.  Pulmonary:      Effort: Pulmonary effort is normal.      Breath sounds: Normal breath sounds.   Abdominal:      General: There is no distension.      Palpations: Abdomen is soft.      Tenderness: There is no abdominal tenderness.   Musculoskeletal:         General: Normal range of motion.      Cervical back: Normal range of motion.   Lymphadenopathy:      Cervical: No cervical adenopathy.   Neurological:      Mental Status: She is alert and oriented to person, place, and time.   Skin:     General: Skin is warm and dry.   Psychiatric:         Mood and Affect: Mood normal.         Behavior: Behavior normal.   Vitals reviewed.

## 2025-01-28 ENCOUNTER — HOSPITAL ENCOUNTER (OUTPATIENT)
Dept: MRI IMAGING | Facility: HOSPITAL | Age: 45
Discharge: HOME | End: 2025-01-28
Payer: COMMERCIAL

## 2025-01-28 DIAGNOSIS — R92.343 EXTREMELY DENSE TISSUE OF BOTH BREASTS ON MAMMOGRAPHY: ICD-10-CM

## 2025-01-28 DIAGNOSIS — Z91.89 AT HIGH RISK FOR BREAST CANCER: ICD-10-CM

## 2025-01-28 PROCEDURE — C8908 MRI W/O FOL W/CONT, BREAST,: HCPCS

## 2025-01-28 PROCEDURE — C8937 CAD BREAST MRI: HCPCS

## 2025-01-28 PROCEDURE — A9585 GADOBUTROL INJECTION: HCPCS | Performed by: CLINICAL NURSE SPECIALIST

## 2025-01-28 RX ORDER — GADOBUTROL 604.72 MG/ML
8 INJECTION INTRAVENOUS
Status: COMPLETED | OUTPATIENT
Start: 2025-01-28 | End: 2025-01-28

## 2025-01-28 RX ADMIN — GADOBUTROL 8 ML: 604.72 INJECTION INTRAVENOUS at 08:40

## 2025-01-29 ENCOUNTER — RESULTS FOLLOW-UP (OUTPATIENT)
Dept: OBGYN CLINIC | Facility: MEDICAL CENTER | Age: 45
End: 2025-01-29

## 2025-02-19 ENCOUNTER — OFFICE VISIT (OUTPATIENT)
Dept: FAMILY MEDICINE CLINIC | Facility: CLINIC | Age: 45
End: 2025-02-19
Payer: COMMERCIAL

## 2025-02-19 VITALS
BODY MASS INDEX: 30.32 KG/M2 | HEART RATE: 77 BPM | RESPIRATION RATE: 16 BRPM | WEIGHT: 182 LBS | SYSTOLIC BLOOD PRESSURE: 112 MMHG | OXYGEN SATURATION: 99 % | DIASTOLIC BLOOD PRESSURE: 80 MMHG | HEIGHT: 65 IN | TEMPERATURE: 97.5 F

## 2025-02-19 DIAGNOSIS — Z00.00 ROUTINE ADULT HEALTH MAINTENANCE: ICD-10-CM

## 2025-02-19 DIAGNOSIS — R21 RASH: ICD-10-CM

## 2025-02-19 DIAGNOSIS — Z12.11 SCREENING FOR MALIGNANT NEOPLASM OF COLON: Primary | ICD-10-CM

## 2025-02-19 PROCEDURE — 99396 PREV VISIT EST AGE 40-64: CPT | Performed by: NURSE PRACTITIONER

## 2025-02-19 RX ORDER — BETAMETHASONE DIPROPIONATE 0.5 MG/G
CREAM TOPICAL
Qty: 30 G | Refills: 0 | Status: SHIPPED | OUTPATIENT
Start: 2025-02-19

## 2025-02-20 NOTE — PROGRESS NOTES
Name: Elizabeth Laughlin      : 1980      MRN: [id number]  Encounter Provider: RYDER Bravo  Encounter Date: 2025   Encounter department: [facility]    Assessment & Plan  Screening for malignant neoplasm of colon    Orders:    Cologuard    Routine adult health maintenance    Orders:    CBC and differential; Future    Comprehensive metabolic panel; Future    Lipid panel; Future    TSH, 3rd generation; Future    UA w Reflex to Microscopic w Reflex to Culture; Future    Rash    Orders:    betamethasone, augmented, (DIPROLENE-AF) 0.05 % cream; Apply topically daily at bedtime as needed (rash on neck)       Physical assessment unremarkable. Labs reviewed were WNL also VS were well controlled. Dosing all possible side effects of the prescribed medications or medications that had been prescribed in the past were reviewed and all questions were answered.  Patient verbalized agreement and understanding of the plan of care as outlined during the office visit today return to office as indicated or sooner if a problem arises.  Labs given is to complete in 6 mos for possible fu discussion. RTO as needed or for next scheduled appt. All questions answered.    History of Present Illness       Patient is here for routine follow-up.  To review most recent labs.  To also discuss current state of health and any new problems that they may be experiencing.  Patient states that medications taken as prescribed and very well tolerated no new complaints at this time.          Review of Systems   Constitutional:  Negative for appetite change and fever.   HENT:  Negative for sinus pressure and sore throat.    Eyes:  Negative for pain.   Respiratory:  Negative for shortness of breath.    Cardiovascular:  Negative for chest pain.   Gastrointestinal:  Negative for abdominal pain.   Genitourinary:  Negative for dysuria.   Musculoskeletal:  Negative for arthralgias and myalgias.   Skin:  Negative for  "color change.   Neurological:  Negative for light-headedness.   Psychiatric/Behavioral:  Negative for behavioral problems.      Past Medical History:   Diagnosis Date    Cellulitis     Varicella      Past Surgical History:   Procedure Laterality Date    CHOLECYSTECTOMY       Family History   Problem Relation Age of Onset    No Known Problems Mother     Heart disease Father     No Known Problems Daughter     Breast cancer Maternal Grandmother 60    Throat cancer Paternal Grandmother     No Known Problems Paternal Grandfather     No Known Problems Son     No Known Problems Maternal Aunt     No Known Problems Maternal Aunt     No Known Problems Maternal Aunt     No Known Problems Maternal Aunt     No Known Problems Paternal Aunt      Social History     Tobacco Use    Smoking status: Never    Smokeless tobacco: Never   Vaping Use    Vaping status: Never Used   Substance and Sexual Activity    Alcohol use: Yes     Alcohol/week: 2.0 standard drinks of alcohol     Types: 2 Glasses of wine per week    Drug use: Never    Sexual activity: Not Currently     No current outpatient medications on file prior to visit.     No Known Allergies  Immunization History   Administered Date(s) Administered    COVID-19 MODERNA VACC 0.5 ML IM 04/23/2021, 05/21/2021, 05/21/2021, 12/11/2021    INFLUENZA 11/12/2020, 10/10/2022    Influenza, injectable, quadrivalent, preservative free 0.5 mL 10/10/2022    Influenza, recombinant, quadrivalent,injectable, preservative free 11/12/2020     Objective   /80 (BP Location: Left arm, Patient Position: Sitting, Cuff Size: Standard)   Pulse 77   Temp 97.5 °F (36.4 °C) (Temporal)   Resp 16   Ht 5' 5\" (1.651 m)   Wt 82.6 kg (182 lb)   LMP 01/14/2025 (Approximate)   SpO2 99%   BMI 30.29 kg/m²     Physical Exam  Vitals and nursing note reviewed.   Constitutional:       Appearance: Normal appearance. She is normal weight.   HENT:      Head: Normocephalic and atraumatic.      Right Ear: Tympanic " membrane, ear canal and external ear normal.      Left Ear: Tympanic membrane, ear canal and external ear normal.      Nose: Nose normal.      Mouth/Throat:      Mouth: Mucous membranes are moist.   Cardiovascular:      Rate and Rhythm: Normal rate and regular rhythm.      Pulses: Normal pulses.      Heart sounds: Normal heart sounds.   Pulmonary:      Effort: Pulmonary effort is normal.      Breath sounds: Normal breath sounds.   Abdominal:      General: Abdomen is flat. Bowel sounds are normal.      Palpations: Abdomen is soft.   Musculoskeletal:         General: Normal range of motion.      Cervical back: Normal range of motion.   Neurological:      General: No focal deficit present.      Mental Status: She is oriented to person, place, and time.   Psychiatric:         Mood and Affect: Mood normal.         Behavior: Behavior normal.         Thought Content: Thought content normal.         Judgment: Judgment normal.

## 2025-03-20 LAB — COLOGUARD RESULT REPORTABLE: NEGATIVE

## 2025-03-31 ENCOUNTER — VBI (OUTPATIENT)
Dept: ADMINISTRATIVE | Facility: OTHER | Age: 45
End: 2025-03-31

## 2025-03-31 NOTE — TELEPHONE ENCOUNTER
03/31/25 9:52 AM     Chart reviewed for CRC: Colonoscopy was/were submitted to the patient's insurance.     Tere Hernandez MA   [facility]

## 2025-04-24 ENCOUNTER — RESULTS FOLLOW-UP (OUTPATIENT)
Dept: OBGYN CLINIC | Facility: MEDICAL CENTER | Age: 45
End: 2025-04-24

## 2025-04-24 ENCOUNTER — HOSPITAL ENCOUNTER (OUTPATIENT)
Dept: MAMMOGRAPHY | Facility: CLINIC | Age: 45
End: 2025-04-24
Payer: COMMERCIAL

## 2025-04-24 VITALS — HEIGHT: 65 IN | WEIGHT: 182 LBS | BODY MASS INDEX: 30.32 KG/M2

## 2025-04-24 DIAGNOSIS — R92.8 ABNORMAL MAMMOGRAM: ICD-10-CM

## 2025-04-24 PROCEDURE — G0279 TOMOSYNTHESIS, MAMMO: HCPCS

## 2025-04-24 PROCEDURE — 77066 DX MAMMO INCL CAD BI: CPT
